# Patient Record
Sex: FEMALE | Race: WHITE | NOT HISPANIC OR LATINO | Employment: OTHER | URBAN - NONMETROPOLITAN AREA
[De-identification: names, ages, dates, MRNs, and addresses within clinical notes are randomized per-mention and may not be internally consistent; named-entity substitution may affect disease eponyms.]

---

## 2021-01-29 ENCOUNTER — IMMUNIZATION (OUTPATIENT)
Dept: VACCINE CLINIC | Facility: HOSPITAL | Age: 76
End: 2021-01-29

## 2021-01-29 PROCEDURE — 0001A: CPT | Performed by: FAMILY MEDICINE

## 2021-01-29 PROCEDURE — 91300 HC SARSCOV02 VAC 30MCG/0.3ML IM: CPT | Performed by: FAMILY MEDICINE

## 2021-02-19 ENCOUNTER — IMMUNIZATION (OUTPATIENT)
Dept: VACCINE CLINIC | Facility: HOSPITAL | Age: 76
End: 2021-02-19

## 2021-02-19 PROCEDURE — 91300 HC SARSCOV02 VAC 30MCG/0.3ML IM: CPT | Performed by: INTERNAL MEDICINE

## 2021-02-19 PROCEDURE — 0002A: CPT | Performed by: INTERNAL MEDICINE

## 2021-03-03 NOTE — PROGRESS NOTES
[facility] Cardiology   Consult  Anastacia Loomis  1945    VISIT DATE:  03/03/21    PCP:   Qing Candelaria, APRN  [address] [place] [address]        CC:  No chief complaint on file.      Problem List:  1.  Hypertension  2.  Diabetes  3.  HLD  4.  Previous breast CA: History of lumpectomy, lymph node dissection, status post 4 cycles of AC chemotherapy, breast radiation-completed anstrazole  5. 1/2 ppd smoker  7.  Lymphedema  8.  Symptomatic PVCs    Labs:  Hemoglobin A1c 7.7  Total cholesterol 148, HDL 63, triglycerides 132, LDL 62    CT scanning cancer surveillance  revealed severe three-vessel coronary artery calcium    History of Present Illness:  Anastacia Loomis  Is a 76 y.o. female with pertinent cardiac history detailed above.  She has multiple risk factors for CAD and was found to have three vessel calcifications on CT scanning.  No  Recent cardiac testing.  EKG with non-specific ST changes, occasional PVCs.  PVCs she states worse with fluctuation in sugar.  Does have frequent indigestion typically with meals.  She does not have any exertional chest pains no dyspnea.  She has left arm lymphedema from her breast cancer surgery but no other swelling.  Lipids and blood pressure are well controlled.  She is on Jardiance for cardiovascular risk reduction.  She smokes about a half a pack per day      There are no active problems to display for this patient.      Not on File    Social History     Socioeconomic History   • Marital status:      Spouse name: Not on file   • Number of children: Not on file   • Years of education: Not on file   • Highest education level: Not on file       No family history on file.    Current Medications:  No current outpatient medications on file.     Review of Systems   Cardiovascular: Negative for chest pain, dyspnea on exertion, irregular heartbeat, leg swelling, near-syncope and orthopnea.   Respiratory: Negative for cough and shortness of breath.     Gastrointestinal: Positive for heartburn.       There were no vitals filed for this visit.    Physical Exam  Vitals signs reviewed.   Constitutional:       Appearance: Normal appearance.   Neck:      Vascular: No carotid bruit.   Cardiovascular:      Rate and Rhythm: Normal rate and regular rhythm.      Pulses: Normal pulses.      Heart sounds: Normal heart sounds.   Pulmonary:      Effort: Pulmonary effort is normal.      Breath sounds: Normal breath sounds.   Abdominal:      Palpations: Abdomen is soft.   Musculoskeletal:      Right lower leg: No edema.      Left lower leg: No edema.      Comments: Lymphedema of the left arm   Neurological:      General: No focal deficit present.      Mental Status: She is alert.         Diagnostic Data:    ECG 12 Lead    Date/Time: 3/4/2021 10:55 AM  Performed by: Dayo Guadarrama MD  Authorized by: Dayo Guadarrama MD   Comparison: not compared with previous ECG   Previous ECG: no previous ECG available  Rhythm: sinus rhythm  Rate: normal  BPM: 73  QRS axis: normal  Other findings: non-specific ST-T wave changes    Clinical impression: abnormal EKG          No results found for: CHLPL, TRIG, HDL, LDLDIRECT  No results found for: GLUCOSE, BUN, CREATININE, NA, K, CL, CO2, CREATININE, BUN  No results found for: HGBA1C  No results found for: WBC, HGB, HCT, PLT    Assessment:  No diagnosis found.    Plan:      1.  Coronary artery calcium on CT scan  -Patient with severe vessel calcification on CT scanning.  She is a diabetic and has multiple risk factors will evaluate for silent ischemia with a stress myocardial perfusion study.  Also obtain echocardiogram.    2.  Hypertension  -Controlled on current medication    3.  Hyperlipidemia  -Well-controlled on rosuvastatin    4.  Diabetes  -Follows with endocrinology, on Jardiance for risk factor reduction    5.  Tobacco use  -Discussed benefits of cessation.  She does not desire pharmacologic therapy at this time    Dayo  DEVON Guadarrama MD FACC

## 2021-03-04 ENCOUNTER — CONSULT (OUTPATIENT)
Dept: CARDIOLOGY | Facility: CLINIC | Age: 76
End: 2021-03-04

## 2021-03-04 VITALS
OXYGEN SATURATION: 96 % | TEMPERATURE: 97.8 F | BODY MASS INDEX: 31.19 KG/M2 | SYSTOLIC BLOOD PRESSURE: 128 MMHG | WEIGHT: 187.2 LBS | HEART RATE: 74 BPM | HEIGHT: 65 IN | DIASTOLIC BLOOD PRESSURE: 60 MMHG

## 2021-03-04 DIAGNOSIS — R94.31 ABNORMAL ELECTROCARDIOGRAM (ECG) (EKG): ICD-10-CM

## 2021-03-04 DIAGNOSIS — F41.9 ANXIETY: ICD-10-CM

## 2021-03-04 DIAGNOSIS — R93.1 HIGH CORONARY ARTERY CALCIUM SCORE: Primary | ICD-10-CM

## 2021-03-04 PROCEDURE — 99204 OFFICE O/P NEW MOD 45 MIN: CPT | Performed by: INTERNAL MEDICINE

## 2021-03-04 PROCEDURE — 93000 ELECTROCARDIOGRAM COMPLETE: CPT | Performed by: INTERNAL MEDICINE

## 2021-03-04 RX ORDER — ASPIRIN 81 MG/1
81 TABLET ORAL DAILY
Qty: 30 TABLET | Refills: 6
Start: 2021-03-04 | End: 2022-03-01

## 2021-03-04 RX ORDER — SERTRALINE HYDROCHLORIDE 25 MG/1
75 TABLET, FILM COATED ORAL DAILY
COMMUNITY

## 2021-03-04 RX ORDER — LOSARTAN POTASSIUM 25 MG/1
25 TABLET ORAL DAILY
COMMUNITY
End: 2022-05-17 | Stop reason: DRUGHIGH

## 2021-03-04 RX ORDER — EMPAGLIFLOZIN 25 MG/1
25 TABLET, FILM COATED ORAL DAILY
COMMUNITY
Start: 2021-02-12 | End: 2022-03-01 | Stop reason: ALTCHOICE

## 2021-03-04 RX ORDER — GLYBURIDE 5 MG/1
10 TABLET ORAL 2 TIMES DAILY
COMMUNITY
Start: 2021-02-22

## 2021-03-04 RX ORDER — VERAPAMIL HYDROCHLORIDE 240 MG/1
240 TABLET, FILM COATED, EXTENDED RELEASE ORAL DAILY
COMMUNITY
Start: 2021-01-05 | End: 2022-07-18 | Stop reason: SDUPTHER

## 2021-03-04 RX ORDER — ROSUVASTATIN CALCIUM 20 MG/1
20 TABLET, COATED ORAL DAILY
COMMUNITY
End: 2022-03-01 | Stop reason: DRUGHIGH

## 2021-03-19 ENCOUNTER — HOSPITAL ENCOUNTER (OUTPATIENT)
Dept: CARDIOLOGY | Facility: HOSPITAL | Age: 76
Discharge: HOME | End: 2021-03-19

## 2021-03-19 VITALS — HEIGHT: 65 IN | BODY MASS INDEX: 31.16 KG/M2 | WEIGHT: 187 LBS

## 2021-03-19 DIAGNOSIS — R93.1 HIGH CORONARY ARTERY CALCIUM SCORE: ICD-10-CM

## 2021-03-19 DIAGNOSIS — R94.31 ABNORMAL ELECTROCARDIOGRAM (ECG) (EKG): ICD-10-CM

## 2021-03-19 NOTE — NURSING NOTE
0845 Pt states she has extreme claustrophobia and that she cannot lay on her back, flat.  LOLITA Marquez, took pt to the camera room to look at the chair, sit in it, and see what it is like with the camera above you. Pt states she could not lay down on the chair, hurt her back, made her very nervous, and very claustrophobic.  RN reminded pt she also has an ECHO at 1:00pm. Pt asks if she has to lay down for that, RN told her yes, pt states no she cannot do that either.  Pt states she needs to cancel both the stress test and the Echo.  We asked to patient to call Dr. Guadarrama's office to talk with him about her other options.  Pt apologized for taking up our time and stated that she would call Dr. Guadarrama's office.

## 2022-02-24 PROBLEM — J45.909 ASTHMA: Status: ACTIVE | Noted: 2022-02-24

## 2022-02-24 PROBLEM — D64.9 ANEMIA: Status: ACTIVE | Noted: 2022-02-24

## 2022-02-24 PROBLEM — C50.919 BREAST CANCER: Status: ACTIVE | Noted: 2022-02-24

## 2022-02-24 PROBLEM — F17.200 SMOKER: Status: ACTIVE | Noted: 2022-02-24

## 2022-02-24 PROBLEM — I89.0 LYMPHEDEMA: Status: ACTIVE | Noted: 2022-02-24

## 2022-02-24 PROBLEM — K50.90 CROHN'S DISEASE: Status: ACTIVE | Noted: 2022-02-24

## 2022-02-24 PROBLEM — E11.9 DM (DIABETES MELLITUS), TYPE 2: Status: ACTIVE | Noted: 2022-02-24

## 2022-02-24 PROBLEM — I10 ESSENTIAL HYPERTENSION: Status: ACTIVE | Noted: 2022-02-24

## 2022-02-24 PROBLEM — E78.2 MIXED HYPERLIPIDEMIA: Status: ACTIVE | Noted: 2022-02-24

## 2022-03-01 ENCOUNTER — OFFICE VISIT (OUTPATIENT)
Dept: CARDIOLOGY | Facility: CLINIC | Age: 77
End: 2022-03-01

## 2022-03-01 VITALS
HEIGHT: 65 IN | OXYGEN SATURATION: 97 % | DIASTOLIC BLOOD PRESSURE: 71 MMHG | HEART RATE: 77 BPM | WEIGHT: 192.4 LBS | SYSTOLIC BLOOD PRESSURE: 169 MMHG | BODY MASS INDEX: 32.06 KG/M2

## 2022-03-01 DIAGNOSIS — I25.10 CORONARY ARTERY DISEASE DUE TO CALCIFIED CORONARY LESION: ICD-10-CM

## 2022-03-01 DIAGNOSIS — I25.84 CORONARY ARTERY DISEASE DUE TO CALCIFIED CORONARY LESION: ICD-10-CM

## 2022-03-01 DIAGNOSIS — R07.2 PRECORDIAL PAIN: ICD-10-CM

## 2022-03-01 DIAGNOSIS — I10 ESSENTIAL HYPERTENSION: Primary | ICD-10-CM

## 2022-03-01 DIAGNOSIS — R06.02 SHORTNESS OF BREATH: ICD-10-CM

## 2022-03-01 DIAGNOSIS — E78.2 MIXED HYPERLIPIDEMIA: ICD-10-CM

## 2022-03-01 DIAGNOSIS — F17.200 SMOKER: ICD-10-CM

## 2022-03-01 DIAGNOSIS — R00.2 PALPITATIONS: ICD-10-CM

## 2022-03-01 PROBLEM — K50.90 CROHN'S DISEASE: Status: RESOLVED | Noted: 2022-02-24 | Resolved: 2022-03-01

## 2022-03-01 PROCEDURE — 99214 OFFICE O/P EST MOD 30 MIN: CPT | Performed by: INTERNAL MEDICINE

## 2022-03-01 PROCEDURE — 93000 ELECTROCARDIOGRAM COMPLETE: CPT | Performed by: INTERNAL MEDICINE

## 2022-03-01 RX ORDER — ASPIRIN 81 MG/1
81 TABLET ORAL DAILY
Qty: 90 TABLET | Refills: 3 | Status: SHIPPED | OUTPATIENT
Start: 2022-03-01

## 2022-03-01 RX ORDER — MONTELUKAST SODIUM 10 MG/1
TABLET ORAL NIGHTLY
COMMUNITY
Start: 2022-01-31 | End: 2022-05-17

## 2022-03-01 RX ORDER — ROSUVASTATIN CALCIUM 40 MG/1
TABLET, COATED ORAL NIGHTLY
COMMUNITY
Start: 2021-10-20

## 2022-03-01 RX ORDER — FEXOFENADINE HCL 180 MG/1
180 TABLET ORAL DAILY
COMMUNITY
End: 2022-11-21

## 2022-03-01 RX ORDER — GUAIFENESIN 600 MG/1
600 TABLET, EXTENDED RELEASE ORAL NIGHTLY PRN
COMMUNITY

## 2022-03-01 RX ORDER — SITAGLIPTIN 100 MG/1
TABLET, FILM COATED ORAL DAILY
COMMUNITY
Start: 2022-01-08

## 2022-03-01 NOTE — PROGRESS NOTES
Subjective   Anastacia Loomis is a 77 y.o. female     Chief Complaint   Patient presents with   • Establish Care     Here for eval.    • Hyperlipidemia   • Hypertension   • Coronary Artery Disease     Per CT lung CA screen at     • Chest Pain   • Shortness of Breath   • Palpitations       PROBLEM LIST:     0. CAD, severe 3 vessel per CT chest at  12-3-2020  1. HTN  2. Hyperlipidemia  3. DM, type 2  4. Hx Breast CA, left s/p chemo./rad. partial mastect. Followed by [facility]  5. Asthma  6. Palpitations  7. Anemia  8. Lymphedema left arm/hand  9. Chronic smoker    Denies Rheumatic / Scarlet Fever        Specialty Problems        Cardiology Problems    Essential hypertension        Mixed hyperlipidemia                HPI:  Ms. Anastacia Jones is a 77-year-old female patient of Qing Cadnelaria seen today for evaluation of coronary artery disease.    Ms. Loomis was treated for breast cancer with partial mastectomy, radiation therapy, and was undergoing screening CT scanning which showed abundant coronary calcification.  Prior to last evening the patient never had chest discomfort.  Last evening, when lying in bed, she developed a burning tight upper retrosternal and upper bilateral precordial chest discomfort which radiated to the shoulders and the upper intrascapular area.  This discomfort was nonpositional, nonpleuritic, but was associated with nausea.  Symptoms lasted 10 to 15 minutes and resolved spontaneously.  The patient describes no exertional chest discomfort.  She describes orthopnea but not PND and has lower extremity edema which is worsened recently after verapamil was increased.  In the spring 2020 when the patient had echocardiography and pharmacologic stress testing ordered.  Those studies were not performed.  The patient describes severe claustrophobia and would not consider any type of nuclear imaging.    Ms. Loomis describes any symptoms of anterior circulation cerebral ischemia, she  describes no davis claudication.  Risk factors for coronary disease include hypertension, diabetes, untreated dyslipidemia, smoking, and family history.                    PRIOR MEDICATIONS    Current Outpatient Medications on File Prior to Visit   Medication Sig Dispense Refill   • fexofenadine (ALLEGRA) 180 MG tablet Take 180 mg by mouth Daily.     • glyburide (DIAbeta) 5 MG tablet 10 mg 2 (Two) Times a Day.     • guaiFENesin (MUCINEX) 600 MG 12 hr tablet Take 600 mg by mouth At Night As Needed for Cough.     • Januvia 100 MG tablet Daily.     • losartan (COZAAR) 25 MG tablet Take 25 mg by mouth Daily.     • metFORMIN (GLUCOPHAGE) 1000 MG tablet Take 1,000 mg by mouth 2 (Two) Times a Day.     • montelukast (SINGULAIR) 10 MG tablet Every Night.     • rosuvastatin (CRESTOR) 40 MG tablet Every Night.     • sertraline (ZOLOFT) 25 MG tablet Take 25 mg by mouth Daily.     • sertraline (ZOLOFT) 50 MG tablet 75 mg Daily.     • verapamil SR (CALAN-SR) 240 MG CR tablet 240 mg Daily.     • [DISCONTINUED] aspirin 81 MG EC tablet Take 1 tablet by mouth Daily. 30 tablet 6   • [DISCONTINUED] Jardiance 25 MG tablet 25 mg Daily.     • [DISCONTINUED] metFORMIN (GLUCOPHAGE) 500 MG tablet Take 1,000 mg by mouth 2 (Two) Times a Day With Meals.     • [DISCONTINUED] rosuvastatin (CRESTOR) 20 MG tablet Take 20 mg by mouth Daily.       No current facility-administered medications on file prior to visit.       ALLERGIES:    Penicillins, Propoxyphene, Sterols (pine), Prednisone, and Sulfamethoxazole    PAST MEDICAL HISTORY:    Past Medical History:   Diagnosis Date   • Anemia    • Arthritis     shoulder on left and right hip   • Asthma    • Cancer (HCC)     left breast, chemo. rad. partial mastect.   • Diabetes mellitus (HCC)    • History of Holter monitoring    • History of ulceration    • Hyperlipidemia    • Hypertension    • Irregular heart rhythm    • Lymphedema    • Menopause        SURGICAL HISTORY:    Past Surgical History:  "  Procedure Laterality Date   • HYSTERECTOMY     • LYMPHADENECTOMY     • SINUS SURGERY     • TUMOR REMOVAL         SOCIAL HISTORY:    Social History     Socioeconomic History   • Marital status:    Tobacco Use   • Smoking status: Current Every Day Smoker     Packs/day: 0.50     Types: Cigarettes   • Smokeless tobacco: Never Used   • Tobacco comment: undecided on quiting   Substance and Sexual Activity   • Alcohol use: Never   • Drug use: Never   • Sexual activity: Defer       FAMILY HISTORY:    Family History   Problem Relation Age of Onset   • Heart attack Mother    • Stroke Father        Review of Systems   Constitutional: Positive for fatigue (increased). Negative for chills, diaphoresis, fever and unexpected weight change.   HENT: Negative.    Eyes: Positive for visual disturbance (glasses).   Respiratory: Positive for shortness of breath.         Denies orthopnea/PND   Cardiovascular: Positive for chest pain (episode chest tightness/burning type sensation across entire chest and into shoulders and through to her back. Lasted approx. 10-15 minutes. went away on its own. No other episodes.), palpitations (increased with elevated glucose) and leg swelling (rt. > lt. ).   Gastrointestinal: Negative for blood in stool (denies melena,hematuria,hematochezia), constipation and diarrhea.   Endocrine: Negative for cold intolerance and heat intolerance.   Genitourinary: Negative.    Musculoskeletal: Positive for arthralgias and myalgias.        Leg cramps at night   Skin: Negative.    Allergic/Immunologic: Positive for environmental allergies.   Neurological: Negative.    Hematological: Bruises/bleeds easily.   Psychiatric/Behavioral: Negative.        VISIT VITALS:  Vitals:    03/01/22 1119   BP: 169/71   BP Location: Left arm   Patient Position: Sitting   Pulse: 77   SpO2: 97%   Weight: 87.3 kg (192 lb 6.4 oz)   Height: 165.1 cm (65\")      /71 (BP Location: Left arm, Patient Position: Sitting)   Pulse 77   " "Ht 165.1 cm (65\")   Wt 87.3 kg (192 lb 6.4 oz)   SpO2 97%   BMI 32.02 kg/m²     RECENT LABS:    Objective       Physical Exam  Vitals and nursing note reviewed.   Constitutional:       General: She is not in acute distress.     Appearance: She is well-developed.   HENT:      Head: Normocephalic and atraumatic.   Eyes:      Conjunctiva/sclera: Conjunctivae normal.      Pupils: Pupils are equal, round, and reactive to light.      Comments: No ptosis or lid lag  Extra ocular motions intact  YUE  Bilat. Arcus senilis     Neck:      Vascular: No carotid bruit, hepatojugular reflux or JVD.      Trachea: No tracheal deviation.      Comments: Nl. Carotid upstrokes  Cardiovascular:      Rate and Rhythm: Normal rate and regular rhythm.      Pulses:           Radial pulses are 2+ on the right side and 2+ on the left side.      Heart sounds: S1 normal and S2 normal. No murmur heard.  No friction rub. Gallop present. S4 (soft) sounds present. No S3 sounds.    Pulmonary:      Effort: Pulmonary effort is normal.      Breath sounds: Normal breath sounds. No wheezing, rhonchi or rales.      Comments: Nl. Expir. Phase  Nl. Breath sound intensity  Bronchial breath sounds in the bases  Mild kyphosis  Abdominal:      General: Bowel sounds are normal. There is no distension or abdominal bruit.      Palpations: Abdomen is soft. There is no mass.      Tenderness: There is no abdominal tenderness. There is no guarding or rebound.      Comments: No organomegaly   Musculoskeletal:         General: No tenderness or deformity. Normal range of motion.      Cervical back: Normal range of motion and neck supple.      Right lower leg: Edema present.      Left lower leg: Edema present.      Comments: LLE, trace edema, unable to palpate pedal pulses, nl. Cap. refill  RLE, 1+ pitting edema, unable to palpate pedal pulses, nl. Cap. refill   Skin:     General: Skin is warm and dry.      Coloration: Skin is not pale.      Findings: No erythema or " rash.   Neurological:      Mental Status: She is alert and oriented to person, place, and time.   Psychiatric:         Behavior: Behavior normal.         Thought Content: Thought content normal.         Judgment: Judgment normal.           ECG 12 Lead    Date/Time: 3/1/2022 12:29 PM  Performed by: Carmelo Block MD  Authorized by: Carmelo Block MD   Comparison: not compared with previous ECG   Comments: Normal sinus rhythm at 70 with borderline first-degree AV block.  Otherwise within normal limits.              Assessment/Plan   #1.  Coronary artery disease.  The patient had severe three-vessel calcification on CT scanning of the chest.  She has had a single episode of chest pain atypical for angina.  The patient refuses nuclear imaging.  Therefore, we will perform dobutamine stress echocardiography for risk ratification and to assess for ischemia as a cause of chest discomfort.  The patient will restart aspirin 81 mg a day.    2.  Orthopnea.  Concerning is the fact that the patient had radiation therapy.  We will obtain echocardiogram to assess LV systolic and diastolic performance, LV filling pressures and pulmonary pressures and, in particular, to look for evidence of constrictive physiology.    3.  Lower extremity and left arm edema.  This is multifactorial in etiology.  I have asked the patient to restrict sodium, elevate her legs, use compression sleeve in her left arm, and follow symptoms closely.    4.  Systemic hypertension.  Blood pressures are generally well controlled.  The patient will continue to monitor and follow-up on blood pressures after testing.    5.  Diabetes.  The patient is not on SGLT2 inhibitor therapy.  Jardiance was stopped because of skin issues.  I wonder if it might be reasonable to trial a another SGLT2 inhibitor because of cardiac and renal protective effects.  I would defer to the patient's other providers in that regard.    6.  Ms. Loomis will follow up in our office  after testing or on a as needed basis as discussed and she will follow with Qing Candelaria as instructed.   Diagnosis Plan   1. Essential hypertension     2. Mixed hyperlipidemia     3. Precordial pain     4. Palpitations     5. Shortness of breath     6. Smoker         No follow-ups on file.         Anastacia Loomis  reports that she has been smoking cigarettes. She has been smoking about 0.50 packs per day. She has never used smokeless tobacco.. I have educated her on the risk of diseases from using tobacco products such as cancer, COPD and heart disease.     I advised her to quit and she is not willing to quit.    I spent 3  minutes counseling the patient.     Advance Care Planning   ACP discussion was held with the patient during this visit. Patient does not have an advance directive, declines further assistance.     Patient's Body mass index is 32.02 kg/m². indicating that she is obese (BMI >30). Obesity-related health conditions include the following: hypertension, diabetes mellitus and dyslipidemias. Obesity is to be assessed at today's visit. BMI is pcp addressing. We discussed portion control and increasing exercise..             Electronically signed by:    Scribed for Carmelo Block MD by Lorena Garay LPN on March 1, 2022  at 11:35 EST    I, Carmelo Block MD personally performed the services described in this documentation as scribed by the above named individual in my presence, and it is both accurate and complete. March 1, 2022 11:35 EST      This note is dictated utilizing voice recognition software.  Although this record has been proof read, transcriptional errors may still be present. If questions occur regarding the content of this record please do not hesitate to call our office.

## 2022-03-17 ENCOUNTER — APPOINTMENT (OUTPATIENT)
Dept: CARDIOLOGY | Facility: HOSPITAL | Age: 77
End: 2022-03-17

## 2022-03-22 ENCOUNTER — APPOINTMENT (OUTPATIENT)
Dept: CARDIOLOGY | Facility: HOSPITAL | Age: 77
End: 2022-03-22

## 2022-03-30 ENCOUNTER — HOSPITAL ENCOUNTER (OUTPATIENT)
Dept: CARDIOLOGY | Facility: HOSPITAL | Age: 77
Discharge: HOME | End: 2022-03-30
Admitting: INTERNAL MEDICINE

## 2022-03-30 ENCOUNTER — HOSPITAL ENCOUNTER (OUTPATIENT)
Dept: CARDIOLOGY | Facility: HOSPITAL | Age: 77
End: 2022-03-30

## 2022-03-30 VITALS — HEIGHT: 65 IN | BODY MASS INDEX: 32.07 KG/M2 | WEIGHT: 192.46 LBS

## 2022-03-30 DIAGNOSIS — I25.84 CORONARY ARTERY DISEASE DUE TO CALCIFIED CORONARY LESION: ICD-10-CM

## 2022-03-30 DIAGNOSIS — F17.200 SMOKER: ICD-10-CM

## 2022-03-30 DIAGNOSIS — I25.10 CORONARY ARTERY DISEASE DUE TO CALCIFIED CORONARY LESION: ICD-10-CM

## 2022-03-30 DIAGNOSIS — I10 ESSENTIAL HYPERTENSION: ICD-10-CM

## 2022-03-30 DIAGNOSIS — R07.2 PRECORDIAL PAIN: ICD-10-CM

## 2022-03-30 DIAGNOSIS — E78.2 MIXED HYPERLIPIDEMIA: ICD-10-CM

## 2022-03-30 DIAGNOSIS — R00.2 PALPITATIONS: ICD-10-CM

## 2022-03-30 DIAGNOSIS — R06.02 SHORTNESS OF BREATH: ICD-10-CM

## 2022-03-30 PROCEDURE — 93356 MYOCRD STRAIN IMG SPCKL TRCK: CPT

## 2022-03-30 PROCEDURE — 93306 TTE W/DOPPLER COMPLETE: CPT

## 2022-03-30 PROCEDURE — 93356 MYOCRD STRAIN IMG SPCKL TRCK: CPT | Performed by: INTERNAL MEDICINE

## 2022-03-30 PROCEDURE — 93306 TTE W/DOPPLER COMPLETE: CPT | Performed by: INTERNAL MEDICINE

## 2022-04-08 ENCOUNTER — APPOINTMENT (OUTPATIENT)
Dept: CARDIOLOGY | Facility: HOSPITAL | Age: 77
End: 2022-04-08

## 2022-04-08 ENCOUNTER — HOSPITAL ENCOUNTER (OUTPATIENT)
Dept: CARDIOLOGY | Facility: HOSPITAL | Age: 77
Discharge: HOME | End: 2022-04-08
Admitting: INTERNAL MEDICINE

## 2022-04-08 VITALS — WEIGHT: 192.46 LBS | BODY MASS INDEX: 32.07 KG/M2 | HEIGHT: 65 IN

## 2022-04-08 DIAGNOSIS — E78.2 MIXED HYPERLIPIDEMIA: ICD-10-CM

## 2022-04-08 DIAGNOSIS — R06.02 SHORTNESS OF BREATH: ICD-10-CM

## 2022-04-08 DIAGNOSIS — I10 ESSENTIAL HYPERTENSION: ICD-10-CM

## 2022-04-08 DIAGNOSIS — R00.2 PALPITATIONS: ICD-10-CM

## 2022-04-08 DIAGNOSIS — R07.2 PRECORDIAL PAIN: ICD-10-CM

## 2022-04-08 DIAGNOSIS — I25.10 CORONARY ARTERY DISEASE DUE TO CALCIFIED CORONARY LESION: ICD-10-CM

## 2022-04-08 DIAGNOSIS — I25.84 CORONARY ARTERY DISEASE DUE TO CALCIFIED CORONARY LESION: ICD-10-CM

## 2022-04-08 DIAGNOSIS — F17.200 SMOKER: ICD-10-CM

## 2022-04-08 PROCEDURE — 93320 DOPPLER ECHO COMPLETE: CPT

## 2022-04-08 PROCEDURE — 93018 CV STRESS TEST I&R ONLY: CPT | Performed by: INTERNAL MEDICINE

## 2022-04-08 PROCEDURE — 25010000002 DOBUTAMINE PER 250 MG: Performed by: INTERNAL MEDICINE

## 2022-04-08 PROCEDURE — 93325 DOPPLER ECHO COLOR FLOW MAPG: CPT

## 2022-04-08 PROCEDURE — 93351 STRESS TTE COMPLETE: CPT

## 2022-04-08 PROCEDURE — 93350 STRESS TTE ONLY: CPT | Performed by: INTERNAL MEDICINE

## 2022-04-08 RX ORDER — DOBUTAMINE HYDROCHLORIDE 200 MG/100ML
10 INJECTION INTRAVENOUS
Status: COMPLETED | OUTPATIENT
Start: 2022-04-08 | End: 2022-04-08

## 2022-04-08 RX ADMIN — DOBUTAMINE HYDROCHLORIDE 10 MCG/KG/MIN: 200 INJECTION INTRAVENOUS at 15:37

## 2022-04-10 LAB
AORTIC DIMENSIONLESS INDEX: 0.65 (DI)
BH CV ECHO LEFT VENTRICLE GLOBAL LONGITUDINAL STRAIN: -12.7 %
BH CV ECHO MEAS - ACS: 1.68 CM
BH CV ECHO MEAS - AO MAX PG: 7.2 MMHG
BH CV ECHO MEAS - AO MEAN PG: 4 MMHG
BH CV ECHO MEAS - AO ROOT DIAM: 2.9 CM
BH CV ECHO MEAS - AO V2 MAX: 134.3 CM/SEC
BH CV ECHO MEAS - AO V2 VTI: 30.1 CM
BH CV ECHO MEAS - EDV(CUBED): 113 ML
BH CV ECHO MEAS - EF(MOD-BP): 54 %
BH CV ECHO MEAS - EF_3D-VOL: 56 %
BH CV ECHO MEAS - ESV(CUBED): 42.5 ML
BH CV ECHO MEAS - FS: 27.8 %
BH CV ECHO MEAS - IVS/LVPW: 0.97 CM
BH CV ECHO MEAS - IVSD: 1.09 CM
BH CV ECHO MEAS - LA 3D VOL INDEX: 41
BH CV ECHO MEAS - LA A2CS (ATRIAL LENGTH): 5.5 CM
BH CV ECHO MEAS - LA DIMENSION(2D): 4.3 CM
BH CV ECHO MEAS - LA DIMENSION: 4.3 CM
BH CV ECHO MEAS - LAT PEAK E' VEL: 7.2 CM/SEC
BH CV ECHO MEAS - LV MASS(C)D: 199.2 GRAMS
BH CV ECHO MEAS - LV MAX PG: 3.1 MMHG
BH CV ECHO MEAS - LV MEAN PG: 1.61 MMHG
BH CV ECHO MEAS - LV V1 MAX: 87.8 CM/SEC
BH CV ECHO MEAS - LV V1 VTI: 18.8 CM
BH CV ECHO MEAS - LVIDD: 4.8 CM
BH CV ECHO MEAS - LVIDS: 3.5 CM
BH CV ECHO MEAS - LVPWD: 1.13 CM
BH CV ECHO MEAS - MED PEAK E' VEL: 5 CM/SEC
BH CV ECHO MEAS - MR MAX PG: 54.3 MMHG
BH CV ECHO MEAS - MR MAX VEL: 368.3 CM/SEC
BH CV ECHO MEAS - MV A MAX VEL: 105.8 CM/SEC
BH CV ECHO MEAS - MV DEC SLOPE: 324.8 CM/SEC2
BH CV ECHO MEAS - MV DEC TIME: 0.18 MSEC
BH CV ECHO MEAS - MV E MAX VEL: 64.9 CM/SEC
BH CV ECHO MEAS - MV E/A: 0.61
BH CV ECHO MEAS - MV MAX PG: 3.8 MMHG
BH CV ECHO MEAS - MV MEAN PG: 1.75 MMHG
BH CV ECHO MEAS - MV P1/2T: 64 MSEC
BH CV ECHO MEAS - MV V2 VTI: 24.4 CM
BH CV ECHO MEAS - MVA(P1/2T): 3.4 CM2
BH CV ECHO MEAS - PA V2 MAX: 93.7 CM/SEC
BH CV ECHO MEAS - PI END-D VEL: 111.1 CM/SEC
BH CV ECHO MEAS - RAP SYSTOLE: 10 MMHG
BH CV ECHO MEAS - RV MAX PG: 2.31 MMHG
BH CV ECHO MEAS - RV V1 MAX: 75.9 CM/SEC
BH CV ECHO MEAS - RV V1 VTI: 16.8 CM
BH CV ECHO MEAS - RVDD: 3.4 CM
BH CV ECHO MEAS - RVSP: 29.4 MMHG
BH CV ECHO MEAS - TAPSE (>1.6): 1.92 CM
BH CV ECHO MEAS - TR MAX PG: 19.4 MMHG
BH CV ECHO MEAS - TR MAX VEL: 220.3 CM/SEC
BH CV ECHO MEASUREMENTS AVERAGE E/E' RATIO: 10.64
BH CV XLRA - TDI S': 10.3 CM/SEC
IVRT: 111 MSEC
LEFT ATRIUM VOLUME INDEX: 31 ML/M2
LEFT ATRIUM VOLUME: 60 CM3
MAXIMAL PREDICTED HEART RATE: 143 BPM
SINUS: 3.1 CM
STJ: 2.4 CM
STRESS TARGET HR: 122 BPM

## 2022-04-11 ENCOUNTER — TELEPHONE (OUTPATIENT)
Dept: CARDIOLOGY | Facility: CLINIC | Age: 77
End: 2022-04-11

## 2022-04-11 NOTE — TELEPHONE ENCOUNTER
ECHO RESULTS BRIEFLY DISCUSSED WITH  JUNG. ISABEL,LPN          ----- Message from Carmelo Block MD sent at 4/11/2022  2:34 PM EDT -----  Keep May f/u appt.

## 2022-04-19 LAB
BH CV STRESS DOSE DOBUTAMINE STAGE 1: 10
BH CV STRESS DURATION MIN STAGE 1: 2
BH CV STRESS DURATION SEC STAGE 1: 0
BH CV STRESS PROTOCOL 1: NORMAL
BH CV STRESS RECOVERY BP: NORMAL MMHG
BH CV STRESS RECOVERY HR: 108 BPM
BH CV STRESS STAGE 1: 1
MAXIMAL PREDICTED HEART RATE: 143 BPM
PERCENT MAX PREDICTED HR: 85.31 %
STRESS BASELINE BP: NORMAL MMHG
STRESS BASELINE HR: 69 BPM
STRESS PERCENT HR: 100 %
STRESS POST PEAK BP: NORMAL MMHG
STRESS POST PEAK HR: 122 BPM
STRESS TARGET HR: 122 BPM

## 2022-04-26 ENCOUNTER — TELEPHONE (OUTPATIENT)
Dept: CARDIOLOGY | Facility: CLINIC | Age: 77
End: 2022-04-26

## 2022-04-26 NOTE — TELEPHONE ENCOUNTER
DAUGHTER, EMANUEL NOTIFIED OF STRESS ECHO RESULTS AND TO KEEP APPT. NEXT MO. PIETRO HALL          ----- Message from Carmelo Block MD sent at 4/26/2022  4:31 PM EDT -----  Keep May f/u appt.

## 2022-05-17 ENCOUNTER — OFFICE VISIT (OUTPATIENT)
Dept: CARDIOLOGY | Facility: CLINIC | Age: 77
End: 2022-05-17

## 2022-05-17 VITALS
HEIGHT: 65 IN | OXYGEN SATURATION: 98 % | HEART RATE: 91 BPM | DIASTOLIC BLOOD PRESSURE: 88 MMHG | BODY MASS INDEX: 32.36 KG/M2 | WEIGHT: 194.2 LBS | SYSTOLIC BLOOD PRESSURE: 179 MMHG

## 2022-05-17 DIAGNOSIS — R00.2 PALPITATIONS: ICD-10-CM

## 2022-05-17 DIAGNOSIS — E78.2 MIXED HYPERLIPIDEMIA: ICD-10-CM

## 2022-05-17 DIAGNOSIS — R60.0 BILATERAL LEG EDEMA: ICD-10-CM

## 2022-05-17 DIAGNOSIS — I10 ESSENTIAL HYPERTENSION: Primary | ICD-10-CM

## 2022-05-17 DIAGNOSIS — H81.13 BENIGN PAROXYSMAL POSITIONAL VERTIGO DUE TO BILATERAL VESTIBULAR DISORDER: ICD-10-CM

## 2022-05-17 DIAGNOSIS — R42 DIZZINESS: ICD-10-CM

## 2022-05-17 DIAGNOSIS — F17.200 SMOKER: ICD-10-CM

## 2022-05-17 DIAGNOSIS — I73.9 PAD (PERIPHERAL ARTERY DISEASE): ICD-10-CM

## 2022-05-17 DIAGNOSIS — R06.02 SHORTNESS OF BREATH: ICD-10-CM

## 2022-05-17 PROCEDURE — 99213 OFFICE O/P EST LOW 20 MIN: CPT | Performed by: INTERNAL MEDICINE

## 2022-05-17 RX ORDER — FUROSEMIDE 20 MG/1
20 TABLET ORAL DAILY
Qty: 30 TABLET | Refills: 11 | Status: SHIPPED | OUTPATIENT
Start: 2022-05-17

## 2022-05-17 RX ORDER — LOSARTAN POTASSIUM 100 MG/1
50 TABLET ORAL DAILY
COMMUNITY
Start: 2022-04-11 | End: 2023-01-11 | Stop reason: ALTCHOICE

## 2022-05-26 ENCOUNTER — APPOINTMENT (OUTPATIENT)
Dept: CARDIOLOGY | Facility: HOSPITAL | Age: 77
End: 2022-05-26

## 2022-06-06 ENCOUNTER — LAB (OUTPATIENT)
Dept: LAB | Facility: HOSPITAL | Age: 77
End: 2022-06-06

## 2022-06-06 ENCOUNTER — HOSPITAL ENCOUNTER (OUTPATIENT)
Dept: CARDIOLOGY | Facility: HOSPITAL | Age: 77
Discharge: HOME | End: 2022-06-06

## 2022-06-06 DIAGNOSIS — R42 DIZZINESS: ICD-10-CM

## 2022-06-06 DIAGNOSIS — H81.13 BENIGN PAROXYSMAL POSITIONAL VERTIGO DUE TO BILATERAL VESTIBULAR DISORDER: ICD-10-CM

## 2022-06-06 DIAGNOSIS — I73.9 PAD (PERIPHERAL ARTERY DISEASE): ICD-10-CM

## 2022-06-06 DIAGNOSIS — I10 ESSENTIAL HYPERTENSION: ICD-10-CM

## 2022-06-06 DIAGNOSIS — R00.2 PALPITATIONS: ICD-10-CM

## 2022-06-06 DIAGNOSIS — R60.0 BILATERAL LEG EDEMA: ICD-10-CM

## 2022-06-06 DIAGNOSIS — E78.2 MIXED HYPERLIPIDEMIA: ICD-10-CM

## 2022-06-06 LAB
ANION GAP SERPL CALCULATED.3IONS-SCNC: 15.5 MMOL/L (ref 5–15)
BUN SERPL-MCNC: 21 MG/DL (ref 8–23)
BUN/CREAT SERPL: 25.6 (ref 7–25)
CALCIUM SPEC-SCNC: 9.7 MG/DL (ref 8.6–10.5)
CHLORIDE SERPL-SCNC: 95 MMOL/L (ref 98–107)
CO2 SERPL-SCNC: 22.5 MMOL/L (ref 22–29)
CREAT SERPL-MCNC: 0.82 MG/DL (ref 0.57–1)
EGFRCR SERPLBLD CKD-EPI 2021: 73.8 ML/MIN/1.73
GLUCOSE SERPL-MCNC: 192 MG/DL (ref 65–99)
MAGNESIUM SERPL-MCNC: 1.6 MG/DL (ref 1.6–2.4)
POTASSIUM SERPL-SCNC: 4 MMOL/L (ref 3.5–5.2)
SODIUM SERPL-SCNC: 133 MMOL/L (ref 136–145)

## 2022-06-06 PROCEDURE — 83735 ASSAY OF MAGNESIUM: CPT

## 2022-06-06 PROCEDURE — 93880 EXTRACRANIAL BILAT STUDY: CPT

## 2022-06-06 PROCEDURE — 93880 EXTRACRANIAL BILAT STUDY: CPT | Performed by: INTERNAL MEDICINE

## 2022-06-06 PROCEDURE — 80048 BASIC METABOLIC PNL TOTAL CA: CPT

## 2022-06-18 LAB
BH CV XLRA MEAS LEFT DIST CCA EDV: -14.1 CM/SEC
BH CV XLRA MEAS LEFT DIST CCA PSV: -61.7 CM/SEC
BH CV XLRA MEAS LEFT DIST ICA EDV: -30.8 CM/SEC
BH CV XLRA MEAS LEFT DIST ICA PSV: -87.4 CM/SEC
BH CV XLRA MEAS LEFT ICA/CCA RATIO: -1.96
BH CV XLRA MEAS LEFT MID ICA EDV: -33.3 CM/SEC
BH CV XLRA MEAS LEFT MID ICA PSV: -93 CM/SEC
BH CV XLRA MEAS LEFT PROX CCA EDV: 22.6 CM/SEC
BH CV XLRA MEAS LEFT PROX CCA PSV: 116.9 CM/SEC
BH CV XLRA MEAS LEFT PROX ECA EDV: -7.9 CM/SEC
BH CV XLRA MEAS LEFT PROX ECA PSV: -84.3 CM/SEC
BH CV XLRA MEAS LEFT PROX ICA EDV: 24.5 CM/SEC
BH CV XLRA MEAS LEFT PROX ICA PSV: 120.7 CM/SEC
BH CV XLRA MEAS LEFT VERTEBRAL A EDV: -23.3 CM/SEC
BH CV XLRA MEAS LEFT VERTEBRAL A PSV: -71 CM/SEC
BH CV XLRA MEAS RIGHT DIST CCA EDV: -14 CM/SEC
BH CV XLRA MEAS RIGHT DIST CCA PSV: -66.8 CM/SEC
BH CV XLRA MEAS RIGHT DIST ICA EDV: -31.4 CM/SEC
BH CV XLRA MEAS RIGHT DIST ICA PSV: -94.3 CM/SEC
BH CV XLRA MEAS RIGHT ICA/CCA RATIO: 1.64
BH CV XLRA MEAS RIGHT MID ICA EDV: -28.9 CM/SEC
BH CV XLRA MEAS RIGHT MID ICA PSV: -110 CM/SEC
BH CV XLRA MEAS RIGHT PROX CCA EDV: 15.7 CM/SEC
BH CV XLRA MEAS RIGHT PROX CCA PSV: 75.4 CM/SEC
BH CV XLRA MEAS RIGHT PROX ECA EDV: -11.9 CM/SEC
BH CV XLRA MEAS RIGHT PROX ECA PSV: -109.6 CM/SEC
BH CV XLRA MEAS RIGHT PROX ICA EDV: -21.4 CM/SEC
BH CV XLRA MEAS RIGHT PROX ICA PSV: -94.9 CM/SEC
BH CV XLRA MEAS RIGHT VERTEBRAL A EDV: 15.7 CM/SEC
BH CV XLRA MEAS RIGHT VERTEBRAL A PSV: 52.8 CM/SEC
MAXIMAL PREDICTED HEART RATE: 143 BPM
STRESS TARGET HR: 122 BPM

## 2022-06-21 ENCOUNTER — TELEPHONE (OUTPATIENT)
Dept: CARDIOLOGY | Facility: CLINIC | Age: 77
End: 2022-06-21

## 2022-06-21 NOTE — TELEPHONE ENCOUNTER
DAUGHTER, EMANUEL, NOTIFIED OF CAROTID U/S RESULTS AND TO RELAY TO MOM. PIETRO HALL           ----- Message from Carmelo Block MD sent at 6/21/2022  8:47 AM EDT -----  Routine f/u.

## 2022-07-18 ENCOUNTER — OFFICE VISIT (OUTPATIENT)
Dept: CARDIOLOGY | Facility: CLINIC | Age: 77
End: 2022-07-18

## 2022-07-18 VITALS
BODY MASS INDEX: 32.42 KG/M2 | HEIGHT: 65 IN | SYSTOLIC BLOOD PRESSURE: 174 MMHG | WEIGHT: 194.6 LBS | HEART RATE: 82 BPM | OXYGEN SATURATION: 96 % | DIASTOLIC BLOOD PRESSURE: 75 MMHG

## 2022-07-18 DIAGNOSIS — R05.9 COUGH: ICD-10-CM

## 2022-07-18 DIAGNOSIS — I47.29 VENTRICULAR TACHYCARDIA (PAROXYSMAL): Primary | ICD-10-CM

## 2022-07-18 DIAGNOSIS — R06.02 SHORTNESS OF BREATH: ICD-10-CM

## 2022-07-18 PROCEDURE — 99213 OFFICE O/P EST LOW 20 MIN: CPT | Performed by: NURSE PRACTITIONER

## 2022-07-18 RX ORDER — DOXYCYCLINE HYCLATE 100 MG/1
100 CAPSULE ORAL 2 TIMES DAILY
COMMUNITY
End: 2023-01-11

## 2022-07-18 NOTE — PROGRESS NOTES
Subjective     Anastacia Loomis is a 77 y.o. female who presents to day for Hypertension.    CHIEF COMPLIANT  Chief Complaint   Patient presents with   • Hypertension       Active Problems:  Problem List Items Addressed This Visit        Pulmonary and Pneumonias    Shortness of breath    Relevant Orders    XR Chest PA & Lateral      Other Visit Diagnoses     Ventricular tachycardia (paroxysmal) (HCC)    -  Primary    Relevant Medications    metoprolol tartrate (LOPRESSOR) 25 MG tablet    Cough        Relevant Orders    XR Chest PA & Lateral      PROBLEM LIST:      0. CAD, severe 3 vessel per CT chest at  12-3-2020  0.1 Dobutamine stress echo, 4-8-2022, no ischemia,   There is very mild inferobasilar and diaphragmatic hypokinesis at baseline with normal augmentation of wall motion and wall thickening in all segments.  There was an increase in global left ventricular ejection fraction and decrease in left ventricular end-systolic dimensions.  1. HTN  2. Hyperlipidemia  3. DM, type 2  4. Hx Breast CA, left s/p chemo./rad. partial mastect. Followed by [facility]  5. Asthma  6. Palpitations  7. Anemia  8. Lymphedema left arm/hand  9. Chronic smoker  10. Echo, 3-, EF 50+-%, mod. LVH, grade 1 DD, minimal mitral leaflet sclerosis, trivial-mild MR, trivial TR, trivial post and post. Lateral pericardial effusion, pulm. Pressures 30 mmHg    HPI  HPI  Ms. Loomis is a 77-year-old female patient who is being followed up today for chronic arterial hypertension, chest pain, and shortness of breath.  She does report an achy type sensation that occurs in her chest that occurs intermittently and can last up to an hour per episode.  It occurs both with rest and exertion with no reported symptoms.  She does report shortness of breath in which she does have associated cough.  She does have green sputum production. Her Sob is worse with exertion but can occur at rest.  She does have chronic arterial hypertension in which  her blood pressure is elevated today at 129/77.  She is currently being treated with losartan, and metoprolol.  She also has chronic lower extremity edema in which she is being treated with Lasix.  She also has continuation of her palpitations and dizziness.  Her do this mainly occurs when she changes positions.  She did go under a dobutamine stress test earlier this year that was negative for ischemia and relatively unremarkable echocardiogram.  However she did have a lateral pericardial effusion.  She does report fatigue that has worsened over the last month or so.  She denies any syncope or strokelike symptoms.  PRIOR MEDS  Current Outpatient Medications on File Prior to Visit   Medication Sig Dispense Refill   • aspirin (aspirin) 81 MG EC tablet Take 1 tablet by mouth Daily. 90 tablet 3   • doxycycline (VIBRAMYCIN) 100 MG capsule Take 100 mg by mouth 2 (Two) Times a Day.     • fexofenadine (ALLEGRA) 180 MG tablet Take 180 mg by mouth Daily.     • furosemide (LASIX) 20 MG tablet Take 1 tablet by mouth Daily. 30 tablet 11   • glyburide (DIAbeta) 5 MG tablet 10 mg 2 (Two) Times a Day.     • guaiFENesin (MUCINEX) 600 MG 12 hr tablet Take 600 mg by mouth At Night As Needed for Cough.     • Januvia 100 MG tablet Daily.     • losartan (COZAAR) 100 MG tablet 50 mg Daily.     • metFORMIN (GLUCOPHAGE) 1000 MG tablet Take 1,000 mg by mouth 2 (Two) Times a Day.     • rosuvastatin (CRESTOR) 40 MG tablet Every Night.     • sertraline (ZOLOFT) 25 MG tablet Take 25 mg by mouth Daily.     • sertraline (ZOLOFT) 50 MG tablet 75 mg Daily.       No current facility-administered medications on file prior to visit.       ALLERGIES  Penicillins, Propoxyphene, Sterols (pine), Jardiance [empagliflozin], Prednisone, and Sulfamethoxazole    HISTORY  Past Medical History:   Diagnosis Date   • Anemia    • Arthritis     shoulder on left and right hip   • Asthma    • Cancer (HCC)     left breast, chemo. rad. partial mastect.   • Diabetes  "mellitus (HCC)    • History of Holter monitoring    • History of ulceration    • Hyperlipidemia    • Hypertension    • Irregular heart rhythm    • Lymphedema    • Menopause        Social History     Socioeconomic History   • Marital status:    Tobacco Use   • Smoking status: Current Every Day Smoker     Packs/day: 0.50     Types: Cigarettes   • Smokeless tobacco: Never Used   • Tobacco comment: undecided on quiting   Substance and Sexual Activity   • Alcohol use: Never   • Drug use: Never   • Sexual activity: Defer       Family History   Problem Relation Age of Onset   • Heart attack Mother    • Rheumatic fever Mother    • Stroke Father    • Heart attack Brother        Review of Systems   Constitutional: Positive for fatigue (last month or so).   HENT: Positive for postnasal drip and rhinorrhea.    Eyes: Positive for visual disturbance (glasses).   Respiratory: Positive for cough (green sputum) and shortness of breath.    Cardiovascular: Positive for chest pain (achey inside of chest, irritated 1 hour at a time), palpitations and leg swelling.   Gastrointestinal: Negative for blood in stool, constipation, diarrhea and nausea.   Endocrine: Positive for polydipsia, polyphagia and polyuria.   Genitourinary: Positive for frequency and urgency.   Musculoskeletal: Positive for arthralgias, back pain and joint swelling.   Skin: Positive for wound.   Neurological: Positive for dizziness. Negative for syncope.   Psychiatric/Behavioral: Positive for sleep disturbance (sleep in recliner).       Objective     VITALS: /75 (BP Location: Right arm, Patient Position: Sitting)   Pulse 82   Ht 165 cm (64.96\")   Wt 88.3 kg (194 lb 9.6 oz)   SpO2 96%   BMI 32.42 kg/m²     LABS:   Lab Results (most recent)     None          IMAGING:   Mammography Breast Diagnostic Tomosynthesis Bilateral    Result Date: 4/4/2022  BI-RADS Code: BI-RADS 2. Benign finding. RECOMMENDATIONS: Diagnostic Mammogram in 1 Year CRITICAL RESULT: " No. COMMUNICATION: The results and recommendations were discussed with the patient and a printed lay language version of the imaging report was given to the patient at the time of the visit. The mammogram was read with the assistance of CAD and tomosynthesis. Dictated by Bassem Cordova on 4/4/2022 1:01 PM Signed by Bassem Cordova on 4/4/2022 1:05 PM    CT Chest Lung Cancer Screening    Result Date: 4/4/2022  Negative screening examination. Recommendations: LungRADS 1: Negative: CT Chest Lung Cancer Screening recommended in 12 months. Modifier: None CRITICAL RESULT:   No. COMMUNICATION: Per this written report. Dictated by Randolph Valle on 4/4/2022 12:29 PM Signed by Randolph Valle on 4/4/2022 12:31 PM      EXAM:  Physical Exam  Vitals and nursing note reviewed.   Constitutional:       Appearance: She is well-developed.   HENT:      Head: Normocephalic.   Eyes:      Pupils: Pupils are equal, round, and reactive to light.   Neck:      Thyroid: No thyroid mass.      Vascular: No carotid bruit or JVD.      Trachea: Trachea and phonation normal.   Cardiovascular:      Rate and Rhythm: Normal rate and regular rhythm.      Pulses:           Radial pulses are 2+ on the right side and 2+ on the left side.        Posterior tibial pulses are 2+ on the right side and 2+ on the left side.      Heart sounds: Normal heart sounds. No murmur heard.    No friction rub. No gallop.   Pulmonary:      Effort: Pulmonary effort is normal. No respiratory distress.      Breath sounds: Normal breath sounds. No wheezing or rales.   Abdominal:      General: Bowel sounds are normal.      Palpations: Abdomen is soft.   Musculoskeletal:         General: Swelling (trace) present. Normal range of motion.      Cervical back: Neck supple.   Skin:     General: Skin is warm and dry.      Capillary Refill: Capillary refill takes less than 2 seconds.      Findings: No rash.   Neurological:      Mental Status: She is alert and oriented to person,  place, and time.   Psychiatric:         Speech: Speech normal.         Behavior: Behavior normal.         Thought Content: Thought content normal.         Judgment: Judgment normal.         Procedure   Procedures       Assessment & Plan    Diagnosis Plan   1. Ventricular tachycardia (paroxysmal) (HCC)  metoprolol tartrate (LOPRESSOR) 25 MG tablet   2. Shortness of breath  XR Chest PA & Lateral   3. Cough  XR Chest PA & Lateral   1.  Due to patient's shortness of breath and productive cough with green sputum I would like to get an x-ray to look for potential cause of the patient's symptoms such as pneumonia or vascular congestion.  2.  We will continue patient's metoprolol for ventricular tachycardia.  She did have a negative dobutamine stress test in the recent past.  3.  Patient's blood pressure is controlled on current blood pressure medication regimen despite elevated blood pressure today.  No medication changes are warranted at this time.  Patient advised to monitor blood pressure on a daily basis and report any persistent highs or lows.  Set goal blood pressure for patient at 130/80 or below.  4.  Informed of signs and symptoms of ACS and advised to seek emergent treatment for any new worsening symptoms.  Patient also advised sooner follow-up as needed.  Also advised to follow-up with family doctor as needed  This note is dictated utilizing voice recognition software.  Although this record has been proof read, transcriptional errors may still be present. If questions occur regarding the content of this record please do not hesitate to call our office.  I have reviewed and confirmed the accuracy of the ROS as documented by the MA/LPN/GUZMAN Burk     No follow-ups on file.    Diagnoses and all orders for this visit:    1. Ventricular tachycardia (paroxysmal) (HCC) (Primary)  -     metoprolol tartrate (LOPRESSOR) 25 MG tablet; Take 1 tablet by mouth 2 (Two) Times a Day.  Dispense: 60 tablet; Refill:  11    2. Shortness of breath  -     XR Chest PA & Lateral; Future    3. Cough  -     XR Chest PA & Lateral; Future        Anastacia Loomis  reports that she has been smoking cigarettes. She has been smoking about 0.50 packs per day. She has never used smokeless tobacco.                MEDS ORDERED DURING VISIT:  New Medications Ordered This Visit   Medications   • metoprolol tartrate (LOPRESSOR) 25 MG tablet     Sig: Take 1 tablet by mouth 2 (Two) Times a Day.     Dispense:  60 tablet     Refill:  11           This document has been electronically signed by Maldonado Erazo Jr., APRMARIAELENA  July 31, 2022 21:50 EDT

## 2022-09-29 ENCOUNTER — TELEPHONE (OUTPATIENT)
Dept: CARDIOLOGY | Facility: CLINIC | Age: 77
End: 2022-09-29

## 2022-09-29 DIAGNOSIS — I10 ESSENTIAL HYPERTENSION: Primary | ICD-10-CM

## 2022-09-29 RX ORDER — AMLODIPINE BESYLATE 5 MG/1
5 TABLET ORAL DAILY
Qty: 30 TABLET | Refills: 11 | Status: SHIPPED | OUTPATIENT
Start: 2022-09-29

## 2022-09-29 NOTE — TELEPHONE ENCOUNTER
----- Message from Anastacia Loomis sent at 9/29/2022 10:48 AM EDT -----  Regarding: Blood Pressure  Good morning,    I wanted to follow-up, I did double my Losartan dosage and have seen very little change in my BP.  My top number is staying in the 150 to 160 range over a bottom number between mid 70 to low 80's.  My last reading yesterday was 164/74 at my family physician office.  My family physician is wanting me to add another blood pressure medicine.  She requested I follow-up with you.     I am also still having more swelling in my right leg and pain.      Thanks     Carla Loomis

## 2022-09-29 NOTE — TELEPHONE ENCOUNTER
Per JR he would like for patient to start Amlodipine 5 mg po qd . Keep record of blood pressure and let us know how they are doing. Sent message through my chart.      Enedina STINSON

## 2022-11-15 ENCOUNTER — TELEPHONE (OUTPATIENT)
Dept: CARDIOLOGY | Facility: CLINIC | Age: 77
End: 2022-11-15

## 2022-11-15 NOTE — TELEPHONE ENCOUNTER
Patient was given a chest x ray order . Patient decided that she does not need to have this done.      Enedina STINSON

## 2022-11-21 ENCOUNTER — OFFICE VISIT (OUTPATIENT)
Dept: CARDIOLOGY | Facility: CLINIC | Age: 77
End: 2022-11-21

## 2022-11-21 VITALS
HEART RATE: 66 BPM | WEIGHT: 196 LBS | OXYGEN SATURATION: 95 % | SYSTOLIC BLOOD PRESSURE: 144 MMHG | BODY MASS INDEX: 32.65 KG/M2 | HEIGHT: 65 IN | DIASTOLIC BLOOD PRESSURE: 60 MMHG

## 2022-11-21 DIAGNOSIS — R60.0 EDEMA LEG: ICD-10-CM

## 2022-11-21 DIAGNOSIS — R00.2 PALPITATIONS: ICD-10-CM

## 2022-11-21 DIAGNOSIS — R06.02 SHORTNESS OF BREATH: ICD-10-CM

## 2022-11-21 DIAGNOSIS — R07.2 PRECORDIAL PAIN: ICD-10-CM

## 2022-11-21 DIAGNOSIS — E78.2 MIXED HYPERLIPIDEMIA: ICD-10-CM

## 2022-11-21 DIAGNOSIS — F17.200 SMOKER: ICD-10-CM

## 2022-11-21 DIAGNOSIS — Z86.16 HISTORY OF COVID-19: ICD-10-CM

## 2022-11-21 DIAGNOSIS — I10 ESSENTIAL HYPERTENSION: Primary | ICD-10-CM

## 2022-11-21 DIAGNOSIS — R60.0 BILATERAL LEG EDEMA: ICD-10-CM

## 2022-11-21 PROCEDURE — 93971 EXTREMITY STUDY: CPT | Performed by: INTERNAL MEDICINE

## 2022-11-21 PROCEDURE — 99214 OFFICE O/P EST MOD 30 MIN: CPT | Performed by: INTERNAL MEDICINE

## 2022-11-21 RX ORDER — ALBUTEROL SULFATE 90 UG/1
AEROSOL, METERED RESPIRATORY (INHALATION)
COMMUNITY
Start: 2022-11-01

## 2022-11-21 RX ORDER — CARVEDILOL 25 MG/1
25 TABLET ORAL 2 TIMES DAILY
Qty: 60 TABLET | Refills: 11 | Status: SHIPPED | OUTPATIENT
Start: 2022-11-21

## 2022-11-21 RX ORDER — ALBUTEROL SULFATE 2.5 MG/3ML
SOLUTION RESPIRATORY (INHALATION)
COMMUNITY
Start: 2022-11-07

## 2022-11-21 RX ORDER — SPIRONOLACTONE 25 MG/1
12.5 TABLET ORAL DAILY
Qty: 15 TABLET | Refills: 11 | Status: SHIPPED | OUTPATIENT
Start: 2022-11-21 | End: 2023-01-11 | Stop reason: SINTOL

## 2022-11-21 NOTE — PROGRESS NOTES
Subjective   Anastacia Loomis is a 77 y.o. female     Chief Complaint   Patient presents with   • Follow-up     Here for 6 mo. F/u   • Hyperlipidemia   • Hypertension   • Palpitations   • Chest Pain   • Shortness of Breath       PROBLEM LIST:        0. CAD, severe 3 vessel per CT chest at  12-3-2020  0.1 Dobutamine stress echo, 4-8-2022, no ischemia,   There is very mild inferobasilar and diaphragmatic hypokinesis at baseline with normal augmentation of wall motion and wall thickening in all segments.  There was an increase in global left ventricular ejection fraction and decrease in left ventricular end-systolic dimensions.  1. HTN  2. Hyperlipidemia  3. DM, type 2  4. Hx Breast CA, left s/p chemo./rad. partial mastect. Followed by [facility]  5. Asthma  6. Palpitations  7. Anemia  8. Lymphedema left arm/hand  9. Chronic smoker  10. Echo, 3-, EF 50+-%, mod. LVH, grade 1 DD, minimal mitral leaflet sclerosis, trivial-mild MR, trivial TR, trivial post and post. Lateral pericardial effusion, pulm. Pressures 30 mmHg    Specialty Problems        Cardiology Problems    Essential hypertension        Mixed hyperlipidemia        Palpitations             HPI:  Ms. Loomis returns for follow-up on the above.    She has been significantly more short of breath and less able physically since she contracted COVID.  However, she feels that she is gradually regaining prior levels of stamina and breathing although she has not reached her baseline.  She also describes that she has had increased right lower extremity edema for the last several months.  She has had no injury, she denies chills or fevers, and she has minimal tenderness.    Ms. Loomis has bilateral shoulder pain which by description is clearly orthopedic in nature, but she denies chest pain, orthopnea, or PND.  She has sensed extrasystoles but she describes no sustained tachypalpitations, she has mild positional dizziness but no presyncope or syncope.   She does not claudicate she has had no symptoms of TIA or stroke.    Blood pressures generally run at home about what was measured in the office today.                        PRIOR MEDICATIONS    Current Outpatient Medications on File Prior to Visit   Medication Sig Dispense Refill   • albuterol (PROVENTIL) (2.5 MG/3ML) 0.083% nebulizer solution prn     • albuterol sulfate  (90 Base) MCG/ACT inhaler prn     • amLODIPine (NORVASC) 5 MG tablet Take 1 tablet by mouth Daily. 30 tablet 11   • aspirin (aspirin) 81 MG EC tablet Take 1 tablet by mouth Daily. 90 tablet 3   • doxycycline (VIBRAMYCIN) 100 MG capsule Take 100 mg by mouth 2 (Two) Times a Day.     • furosemide (LASIX) 20 MG tablet Take 1 tablet by mouth Daily. 30 tablet 11   • glyburide (DIAbeta) 5 MG tablet 10 mg 2 (Two) Times a Day.     • guaiFENesin (MUCINEX) 600 MG 12 hr tablet Take 600 mg by mouth At Night As Needed for Cough.     • Januvia 100 MG tablet Daily.     • losartan (COZAAR) 100 MG tablet 50 mg Daily.     • metFORMIN (GLUCOPHAGE) 1000 MG tablet Take 1,000 mg by mouth 2 (Two) Times a Day.     • metoprolol tartrate (LOPRESSOR) 25 MG tablet Take 1 tablet by mouth 2 (Two) Times a Day. 60 tablet 11   • rosuvastatin (CRESTOR) 40 MG tablet Every Night.     • sertraline (ZOLOFT) 25 MG tablet Take 75 mg by mouth Daily.     • [DISCONTINUED] fexofenadine (ALLEGRA) 180 MG tablet Take 180 mg by mouth Daily.       No current facility-administered medications on file prior to visit.       ALLERGIES:    Penicillins, Propoxyphene, Sterols (pine), Jardiance [empagliflozin], Meperidine, Prednisone, and Sulfamethoxazole    PAST MEDICAL HISTORY:    Past Medical History:   Diagnosis Date   • Anemia    • Arthritis     shoulder on left and right hip   • Asthma    • Cancer (HCC)     left breast, chemo. rad. partial mastect.   • Diabetes mellitus (HCC)    • History of Holter monitoring    • History of ulceration    • Hyperlipidemia    • Hypertension    • Irregular  "heart rhythm    • Lymphedema    • Menopause        SURGICAL HISTORY:    Past Surgical History:   Procedure Laterality Date   • HYSTERECTOMY     • LYMPHADENECTOMY     • MASTECTOMY, PARTIAL      left   • SINUS SURGERY     • TUMOR REMOVAL         SOCIAL HISTORY:    Social History     Socioeconomic History   • Marital status:    Tobacco Use   • Smoking status: Every Day     Packs/day: 0.50     Types: Cigarettes   • Smokeless tobacco: Never   • Tobacco comments:     undecided on quiting   Substance and Sexual Activity   • Alcohol use: Never   • Drug use: Never   • Sexual activity: Defer       FAMILY HISTORY:    Family History   Problem Relation Age of Onset   • Heart attack Mother    • Rheumatic fever Mother    • Stroke Father    • Heart attack Brother        Review of Systems   Constitutional: Positive for fatigue (covid 2 weeks ago).   HENT: Negative.    Eyes: Positive for visual disturbance (glasses).   Respiratory: Negative.    Cardiovascular: Positive for palpitations and leg swelling. Negative for chest pain.   Gastrointestinal: Negative.    Endocrine: Negative.    Genitourinary: Negative.    Musculoskeletal: Positive for arthralgias and myalgias.   Skin: Negative.    Allergic/Immunologic: Positive for environmental allergies.   Neurological: Negative.    Hematological: Bruises/bleeds easily.   Psychiatric/Behavioral: Negative.        VISIT VITALS:  Vitals:    11/21/22 1136   BP: 144/60   BP Location: Right arm   Patient Position: Sitting   Pulse: 66   SpO2: 95%   Weight: 88.9 kg (196 lb)   Height: 165 cm (64.96\")      /60 (BP Location: Right arm, Patient Position: Sitting)   Pulse 66   Ht 165 cm (64.96\")   Wt 88.9 kg (196 lb)   SpO2 95%   BMI 32.66 kg/m²     RECENT LABS:    Objective       Physical Exam  Vitals and nursing note reviewed.   Constitutional:       General: She is not in acute distress.     Appearance: She is well-developed.   HENT:      Head: Normocephalic and atraumatic.   Eyes:    "   Conjunctiva/sclera: Conjunctivae normal.      Pupils: Pupils are equal, round, and reactive to light.   Neck:      Vascular: No carotid bruit, hepatojugular reflux or JVD.      Trachea: No tracheal deviation.      Comments: Nl. Carotid upstrokes  Cardiovascular:      Rate and Rhythm: Normal rate and regular rhythm.      Pulses:           Radial pulses are 2+ on the right side and 2+ on the left side.      Heart sounds: Heart sounds are distant (S1 & S2). No murmur heard.    No friction rub. Gallop present. S4 sounds present. No S3 sounds.   Pulmonary:      Effort: Pulmonary effort is normal.      Breath sounds: Examination of the right-lower field reveals rhonchi. Examination of the left-lower field reveals rhonchi. Rhonchi present. No wheezing or rales.      Comments: Dullness left base  Mildly depressed breath sounds  No consolidation    Abdominal:      General: Bowel sounds are normal. There is no distension or abdominal bruit.      Palpations: Abdomen is soft. There is no mass.      Tenderness: There is no abdominal tenderness. There is no guarding or rebound.      Comments: No organomegaly   Musculoskeletal:         General: No tenderness or deformity. Normal range of motion.      Cervical back: Normal range of motion and neck supple.      Right lower leg: Edema present.      Left lower leg: Edema present.      Comments: LLE, trace-1+ edema at the ankle, unable to palpate pedal pulses  RLE, 1 1/2+ edema to lower calf, palpable DP pedal pulse   Skin:     General: Skin is warm and dry.      Coloration: Skin is not pale.      Findings: No erythema or rash.   Neurological:      Mental Status: She is alert and oriented to person, place, and time.   Psychiatric:         Behavior: Behavior normal.         Thought Content: Thought content normal.         Judgment: Judgment normal.         Procedures      Assessment & Plan   #1.  Right lower extremity edema.  Particularly given the patient's bout with COVID I would  like to perform venous duplex study to exclude DVT as a cause.  In the interim we will pursue mechanical measures with compression hose, leg elevation, and increased water intake.  Also, the patient has been limited in taking Lasix because of nocturnal leg cramps.  Therefore, I would like to add Aldactone 12.5 mg daily to see if we can increase diuretic effect of medications while we are pursuing the above.    2.  Systemic hypertension.  We will change metoprolol to carvedilol which may provide improved blood pressure control along with Aldactone.  Additionally, carvedilol will be neutral from the standpoint of Leukos metabolism.    3.  Palpitations.  These are minimally symptomatic, or stable over time.  The patient has no symptoms to suggest sustained tacky dysrhythmia disease the patient had nonsustained V. tach on prior event monitoring.    4.  Shoulder pain.  Although CT scan showed multivessel coronary disease dobutamine stress echo showed no evidence of ischemia.  We will continue risk factor modification.    5.  We will check a basic metabolic panel and magnesium in 2 to 3 weeks and we will plan on seeing the patient in follow-up after the holidays or on appearing basis as discussed.   Diagnosis Plan   1. Essential hypertension        2. Mixed hyperlipidemia        3. Palpitations        4. Precordial pain        5. Shortness of breath        6. Smoker            No follow-ups on file.         Anastacia Loomis  reports that she has been smoking cigarettes. She has been smoking an average of .5 packs per day. She has never used smokeless tobacco.. I have educated her on the risk of diseases from using tobacco products such as cancer, COPD and heart disease.     I advised her to quit and she is not willing to quit.    I spent 3  minutes counseling the patient.          BMI is >= 30 and <35. (Class 1 Obesity). The following options were offered after discussion;: pcp addressing     Advance Care Planning   ACP  discussion was held with the patient during this visit. Patient does not have an advance directive, declines further assistance.           Electronically signed by:    Scribed for Carmelo Block MD by Lorena Garay LPN on November 21, 2022  at 11:41 EST    I, Carmelo Block MD personally performed the services described in this documentation as scribed by the above named individual in my presence, and it is both accurate and complete. November 21, 2022 11:41 EST      Dictated Utilizing Dragon Dictation: Part of this note may be an electronic transcription/translation of spoken language to printed text using the Dragon Dictation System.

## 2022-12-05 ENCOUNTER — TELEPHONE (OUTPATIENT)
Dept: CARDIOLOGY | Facility: CLINIC | Age: 77
End: 2022-12-05

## 2022-12-05 NOTE — TELEPHONE ENCOUNTER
CALLED DAUGHTER TO DISCUSS DAD'S INR AND STATES SINCE STARTING BOTH THE COREG AND ALDACTONE SHE STARTED ITCHING REALLY BAD. STOPPED THE ALDACTONE AND SX'S RESOLVED. TOLD IZZY I WOULD DISCUSS WITH DR. BLOCK AND LET HER KNOW IF ANY CHANGED NEEDED TO BE MADE. PIETRO HALL    12-8-2022 v/o per Dr. Block to stay off Aldactone. Attempted to call daughterizzy to just remind her, but had originally told her I would only call her back if changes needed.  PH,LPN

## 2022-12-07 LAB
BUN SERPL-MCNC: 30 MG/DL (ref 8–27)
BUN/CREAT SERPL: 34 (ref 12–28)
CALCIUM SERPL-MCNC: 9.5 MG/DL (ref 8.7–10.3)
CHLORIDE SERPL-SCNC: 103 MMOL/L (ref 96–106)
CO2 SERPL-SCNC: 24 MMOL/L (ref 20–29)
CREAT SERPL-MCNC: 0.89 MG/DL (ref 0.57–1)
EGFRCR SERPLBLD CKD-EPI 2021: 67 ML/MIN/1.73
GLUCOSE SERPL-MCNC: 255 MG/DL (ref 70–99)
MAGNESIUM SERPL-MCNC: 1.9 MG/DL (ref 1.6–2.3)
POTASSIUM SERPL-SCNC: 4.9 MMOL/L (ref 3.5–5.2)
SODIUM SERPL-SCNC: 140 MMOL/L (ref 134–144)

## 2023-01-11 ENCOUNTER — OFFICE VISIT (OUTPATIENT)
Dept: CARDIOLOGY | Facility: CLINIC | Age: 78
End: 2023-01-11
Payer: MEDICARE

## 2023-01-11 VITALS
HEIGHT: 65 IN | SYSTOLIC BLOOD PRESSURE: 148 MMHG | OXYGEN SATURATION: 98 % | BODY MASS INDEX: 32.59 KG/M2 | DIASTOLIC BLOOD PRESSURE: 67 MMHG | HEART RATE: 81 BPM | WEIGHT: 195.6 LBS

## 2023-01-11 DIAGNOSIS — R06.02 SHORTNESS OF BREATH: ICD-10-CM

## 2023-01-11 DIAGNOSIS — I10 ESSENTIAL HYPERTENSION: Primary | ICD-10-CM

## 2023-01-11 DIAGNOSIS — E78.2 MIXED HYPERLIPIDEMIA: ICD-10-CM

## 2023-01-11 DIAGNOSIS — F17.200 SMOKER: ICD-10-CM

## 2023-01-11 DIAGNOSIS — R60.0 BILATERAL LEG EDEMA: ICD-10-CM

## 2023-01-11 DIAGNOSIS — R07.2 PRECORDIAL PAIN: ICD-10-CM

## 2023-01-11 DIAGNOSIS — R00.2 PALPITATIONS: ICD-10-CM

## 2023-01-11 PROCEDURE — 99214 OFFICE O/P EST MOD 30 MIN: CPT | Performed by: INTERNAL MEDICINE

## 2023-01-11 RX ORDER — OLMESARTAN MEDOXOMIL 40 MG/1
40 TABLET ORAL DAILY
Qty: 30 TABLET | Refills: 11 | Status: SHIPPED | OUTPATIENT
Start: 2023-01-11

## 2023-01-11 RX ORDER — EPLERENONE 25 MG/1
25 TABLET, FILM COATED ORAL DAILY
Qty: 30 TABLET | Refills: 11 | Status: SHIPPED | OUTPATIENT
Start: 2023-01-11

## 2023-01-11 RX ORDER — FLUTICASONE PROPIONATE 50 MCG
1 SPRAY, SUSPENSION (ML) NASAL DAILY PRN
COMMUNITY

## 2023-01-11 RX ORDER — BROMFENAC 1.03 MG/ML
1 SOLUTION/ DROPS OPHTHALMIC DAILY
COMMUNITY
Start: 2023-01-06

## 2023-01-24 ENCOUNTER — TRANSCRIBE ORDERS (OUTPATIENT)
Dept: WOUND CARE | Facility: HOSPITAL | Age: 78
End: 2023-01-24
Payer: MEDICARE

## 2023-01-24 DIAGNOSIS — I89.0 LYMPHEDEMA: Primary | ICD-10-CM

## 2023-04-11 ENCOUNTER — OFFICE VISIT (OUTPATIENT)
Dept: CARDIOLOGY | Facility: CLINIC | Age: 78
End: 2023-04-11
Payer: MEDICARE

## 2023-04-11 VITALS
HEIGHT: 65 IN | HEART RATE: 62 BPM | WEIGHT: 191 LBS | BODY MASS INDEX: 31.82 KG/M2 | DIASTOLIC BLOOD PRESSURE: 57 MMHG | SYSTOLIC BLOOD PRESSURE: 129 MMHG | OXYGEN SATURATION: 98 %

## 2023-04-11 DIAGNOSIS — F17.200 SMOKER: ICD-10-CM

## 2023-04-11 DIAGNOSIS — R07.2 PRECORDIAL PAIN: ICD-10-CM

## 2023-04-11 DIAGNOSIS — I10 ESSENTIAL HYPERTENSION: Primary | ICD-10-CM

## 2023-04-11 DIAGNOSIS — R06.02 SHORTNESS OF BREATH: ICD-10-CM

## 2023-04-11 DIAGNOSIS — E78.2 MIXED HYPERLIPIDEMIA: ICD-10-CM

## 2023-04-11 DIAGNOSIS — R00.2 PALPITATIONS: ICD-10-CM

## 2023-04-11 PROCEDURE — 3078F DIAST BP <80 MM HG: CPT | Performed by: INTERNAL MEDICINE

## 2023-04-11 PROCEDURE — 3074F SYST BP LT 130 MM HG: CPT | Performed by: INTERNAL MEDICINE

## 2023-04-11 PROCEDURE — 99213 OFFICE O/P EST LOW 20 MIN: CPT | Performed by: INTERNAL MEDICINE

## 2023-04-11 NOTE — PROGRESS NOTES
"Subjective   Anastacia Loomis is a 78 y.o. female     Chief Complaint   Patient presents with   • Follow-up     Here for 3 mo. F/u   • Hyperlipidemia   • Hypertension   • Palpitations       PROBLEM LIST:        0. CAD, severe 3 vessel per CT chest at  12-3-2020  0.1 Dobutamine stress echo, 4-8-2022, no ischemia,   There is very mild inferobasilar and diaphragmatic hypokinesis at baseline with normal augmentation of wall motion and wall thickening in all segments.  There was an increase in global left ventricular ejection fraction and decrease in left ventricular end-systolic dimensions.  1. HTN  2. Hyperlipidemia  3. DM, type 2  4. Hx Breast CA, left s/p chemo./rad. partial mastect. Follow[facility] by [facility]  5. Asthma  6. Palpitations  7. Anemia  8. Lymphedema left arm/hand  9. Chronic smoker  10. Echo, 3-, EF 50+-%, mod. LVH, grade 1 DD, minimal mitral leaflet sclerosis, trivial-mild MR, trivial TR, trivial post and post. Lateral pericardial effusion, pulm. Pressures 30 mmHg    Specialty Problems        Cardiology Problems    Essential hypertension        Mixed hyperlipidemia        Palpitations             HPI:  Ms. Loomis returns for follow-up on the above.    She is tolerating eplerenone and she has had marked improvement in lower extremity edema.  She has lost over 4 pounds.  She describes that she will have nocturnal leg cramps and will hold Lasix for a day and symptoms will resolve.    Ms. Loomis continues to describe chest discomfort.  She has a \"emptiness\" or \"heaviness\" in her chest which she relates to grief from her 's recent death.  She has no exertional symptoms and her functional capacity, albeit limited, has not changed.  She has minimal palpitations which are at baseline.  She denies orthopnea or PND.  She has no lightheadedness or dizziness.  Blood pressures are improved from prior                      PRIOR MEDICATIONS    Current Outpatient Medications on File Prior to " Visit   Medication Sig Dispense Refill   • albuterol (PROVENTIL) (2.5 MG/3ML) 0.083% nebulizer solution prn     • albuterol sulfate  (90 Base) MCG/ACT inhaler prn     • amLODIPine (NORVASC) 5 MG tablet Take 1 tablet by mouth Daily. 30 tablet 11   • aspirin (aspirin) 81 MG EC tablet Take 1 tablet by mouth Daily. 90 tablet 3   • carvedilol (COREG) 25 MG tablet Take 1 tablet by mouth 2 (Two) Times a Day. 60 tablet 11   • eplerenone (INSPRA) 25 MG tablet Take 1 tablet by mouth Daily. 30 tablet 11   • fluticasone (FLONASE) 50 MCG/ACT nasal spray 1 spray into the nostril(s) as directed by provider Daily As Needed. prn     • furosemide (LASIX) 20 MG tablet Take 1 tablet by mouth Daily. (Patient taking differently: Take 1 tablet by mouth. Takes at least 4-5 x a week. Will skip due to leg cramps) 30 tablet 11   • glyburide (DIAbeta) 5 MG tablet 2 tablets 2 (Two) Times a Day.     • guaiFENesin (MUCINEX) 600 MG 12 hr tablet Take 1 tablet by mouth At Night As Needed for Cough.     • Januvia 100 MG tablet Daily.     • metFORMIN (GLUCOPHAGE) 1000 MG tablet Take 1 tablet by mouth 2 (Two) Times a Day.     • olmesartan (Benicar) 40 MG tablet Take 1 tablet by mouth Daily. 30 tablet 11   • rosuvastatin (CRESTOR) 40 MG tablet Every Night.     • sertraline (ZOLOFT) 25 MG tablet Take 3 tablets by mouth Daily.     • [DISCONTINUED] bromfenac sodium, Once-Daily, (BROMDAY) 0.09 % ophthalmic solution Administer 1 drop to the right eye Daily.       No current facility-administered medications on file prior to visit.       ALLERGIES:    Penicillins, Propoxyphene, Sterols (pine), Aldactone [spironolactone], Jardiance [empagliflozin], Meperidine, Prednisone, and Sulfamethoxazole    PAST MEDICAL HISTORY:    Past Medical History:   Diagnosis Date   • Anemia    • Arthritis     shoulder on left and right hip   • Asthma    • Cancer     left breast, chemo. rad. partial mastect.   • Diabetes mellitus    • History of Holter monitoring    • History  "of ulceration    • Hyperlipidemia    • Hypertension    • Irregular heart rhythm    • Lymphedema    • Menopause        SURGICAL HISTORY:    Past Surgical History:   Procedure Laterality Date   • HYSTERECTOMY     • LYMPHADENECTOMY     • MASTECTOMY, PARTIAL      left   • SINUS SURGERY     • TUMOR REMOVAL         SOCIAL HISTORY:    Social History     Socioeconomic History   • Marital status:    Tobacco Use   • Smoking status: Every Day     Packs/day: 0.50     Types: Cigarettes   • Smokeless tobacco: Never   • Tobacco comments:     undecided on quiting   Substance and Sexual Activity   • Alcohol use: Never   • Drug use: Never   • Sexual activity: Defer       FAMILY HISTORY:    Family History   Problem Relation Age of Onset   • Heart attack Mother    • Rheumatic fever Mother    • Stroke Father    • Heart attack Brother        Review of Systems   Constitutional: Positive for fatigue.   HENT: Negative.    Eyes: Positive for visual disturbance (glasses).   Respiratory: Positive for shortness of breath (with exertion).         Denies orthopnea/PND   Cardiovascular: Positive for chest pain, palpitations and leg swelling (improving).   Gastrointestinal: Negative.    Endocrine: Negative.    Genitourinary: Negative.    Musculoskeletal: Positive for arthralgias, back pain (lower) and myalgias.   Skin: Negative.    Allergic/Immunologic: Positive for environmental allergies.   Neurological: Positive for weakness (overall). Negative for dizziness, syncope and light-headedness.        Denies stroke like sx's   Hematological: Bruises/bleeds easily.   Psychiatric/Behavioral: Positive for sleep disturbance.       VISIT VITALS:  Vitals:    04/11/23 1118   BP: 129/57   BP Location: Left arm   Patient Position: Sitting   Pulse: 62   SpO2: 98%   Weight: 86.6 kg (191 lb)   Height: 165 cm (64.96\")      /57 (BP Location: Left arm, Patient Position: Sitting)   Pulse 62   Ht 165 cm (64.96\")   Wt 86.6 kg (191 lb)   SpO2 98%   BMI " 31.82 kg/m²     RECENT LABS:    Objective       Physical Exam  Vitals and nursing note reviewed.   Constitutional:       General: She is not in acute distress.     Appearance: She is well-developed.   HENT:      Head: Normocephalic and atraumatic.   Eyes:      Conjunctiva/sclera: Conjunctivae normal.      Pupils: Pupils are equal, round, and reactive to light.   Neck:      Vascular: No carotid bruit, hepatojugular reflux or JVD.      Trachea: No tracheal deviation.      Comments: Nl. Carotid upstrokes  Cardiovascular:      Rate and Rhythm: Normal rate and regular rhythm.      Pulses:           Radial pulses are 2+ on the right side and 2+ on the left side.      Heart sounds: S1 normal and S2 normal. Murmur heard.    Systolic murmur is present.    No friction rub. Gallop present. S4 sounds present.      Comments: 1/6 systolic ejection murmur RUSB  1/6 TR  Pulmonary:      Effort: Pulmonary effort is normal.      Breath sounds: Normal breath sounds. No wheezing, rhonchi or rales.      Comments: Nl. Expir. Phase  Mildly decreased Breath sound intensity    Abdominal:      General: Bowel sounds are normal. There is no distension or abdominal bruit.      Palpations: Abdomen is soft. There is no mass.      Tenderness: There is no abdominal tenderness. There is no guarding or rebound.      Comments: No organomegaly   Musculoskeletal:         General: No tenderness or deformity. Normal range of motion.      Cervical back: Normal range of motion and neck supple.      Right lower leg: Edema present.      Left lower leg: Edema present.      Comments: LLE, 1+ edema to lower calf, palpable pedal pulses  RLE, trace-1+ edema to lower calf and in the ankle, palpable pedal pulses   Skin:     General: Skin is warm and dry.      Coloration: Skin is not pale.      Findings: No erythema or rash.   Neurological:      Mental Status: She is alert and oriented to person, place, and time.   Psychiatric:         Behavior: Behavior normal.          Thought Content: Thought content normal.         Judgment: Judgment normal.         Procedures      Assessment & Plan   #1.  Coronary artery disease.  The patient had coronary artery disease by CTA but no evidence of ischemia on stress testing.  Current symptoms are not felt to be anginal in nature.  We will continue clinical monitoring and risk factor modification.    2.  Systemic hypertension.  Blood pressures are better controlled and we will continue as present.    3.  Lower extremity edema has improved significantly with eplerenone.  We will continue current medications and sodium restriction.    4.  Decreased functional capacity.  I believe this relates to physical deconditioning and chronic lung disease.  Ms. Loomis will try to increase her physical activity.  We also discussed walking cessation in some detail today.  The patient will make efforts at decreasing her cigarette consumption with ultimate goal of complete cessation of cigarette use.    5.  Ms. Loomis will follow with [facility] Candelaria as instructed we will plan on seeing her in follow-up in 6 months or on appearing basis as discussed.   Diagnosis Plan   1. Essential hypertension        2. Mixed hyperlipidemia        3. Palpitations        4. Precordial pain        5. Shortness of breath        6. Smoker            No follow-ups on file.         Anastacia Loomis  reports that she has been smoking cigarettes. She has been smoking an average of .5 packs per day. She has never used smokeless tobacco.. I have educated her on the risk of diseases from using tobacco products such as cancer, COPD and heart disease.     I advised her to quit and she is not willing to quit.    I spent 3  minutes counseling the patient.          BMI is >= 30 and <35. (Class 1 Obesity). The following options were offered after discussion;: pcp addressing       Advance Care Planning   ACP discussion was held with the patient during this visit. Patient does not have an advance  directive, declines further assistance.         Electronically signed by:    Scribed for Carmelo Block MD by Lorena Garay LPN on April 11, 2023  at 11:23 EDT    I, Carmelo Block MD personally performed the services described in this documentation as scribed by the above named individual in my presence, and it is both accurate and complete. April 11, 2023 11:23 EDT      Dictated Utilizing Dragon Dictation: Part of this note may be an electronic transcription/translation of spoken language to printed text using the Dragon Dictation System.

## 2023-04-12 ENCOUNTER — TELEPHONE (OUTPATIENT)
Dept: CARDIOLOGY | Facility: CLINIC | Age: 78
End: 2023-04-12
Payer: MEDICARE

## 2023-04-12 DIAGNOSIS — R06.02 SHORTNESS OF BREATH: Primary | ICD-10-CM

## 2023-04-12 DIAGNOSIS — R60.0 BILATERAL LEG EDEMA: ICD-10-CM

## 2023-04-12 NOTE — TELEPHONE ENCOUNTER
Pt's daughter, Galina, FRANCISCO JAVIERM stating pt was seen yesterday and that PCP didn't get the lab results to us. Stated PCP called and told pt to double Lasix and stop other diuretic due to elevated potassium.  Wanted to run that by Dr. Block and Pat before they make any changes as he is the prescribing provider.   #[phone]      Per chart review, we have not received lab results on pt.       I called PCP's office, I requested most recent lab results be faxed to us, I was told they would send them.

## 2023-04-13 RX ORDER — EPLERENONE 25 MG/1
TABLET, FILM COATED ORAL
Qty: 30 TABLET | Refills: 11
Start: 2023-04-13

## 2023-04-13 RX ORDER — FUROSEMIDE 20 MG/1
TABLET ORAL
Qty: 30 TABLET | Refills: 11
Start: 2023-04-13

## 2023-04-13 NOTE — TELEPHONE ENCOUNTER
Labs and chart reviewed with Dr. Block, K+ 5.5, and recommendations from family pcp regarding diuretics and repeat labs. V/o per Dr. Block to hold both lasix and Inspra 3 days then go to every other day on both meds and have B?P/MAG drawn in 10 days to 2 week. Called and discussed all of above in detail with daughter, Galina. Verbalized she understood and aware repeat labs orders to be mailed to her. PH,LPN

## 2023-04-18 ENCOUNTER — HOSPITAL ENCOUNTER (OUTPATIENT)
Dept: OCCUPATIONAL THERAPY | Facility: HOSPITAL | Age: 78
Setting detail: THERAPIES SERIES
Discharge: HOME | End: 2023-04-18
Payer: MEDICARE

## 2023-04-18 DIAGNOSIS — Z92.21 HISTORY OF CHEMOTHERAPY: ICD-10-CM

## 2023-04-18 DIAGNOSIS — I89.0 LYMPHEDEMA: Primary | ICD-10-CM

## 2023-04-18 DIAGNOSIS — Z92.3 HISTORY OF RADIATION THERAPY: ICD-10-CM

## 2023-04-18 DIAGNOSIS — D64.9 ANEMIA, UNSPECIFIED TYPE: ICD-10-CM

## 2023-04-18 DIAGNOSIS — Z90.12 HISTORY OF PARTIAL MASTECTOMY OF LEFT BREAST: ICD-10-CM

## 2023-04-18 DIAGNOSIS — Z85.9 HISTORY OF CANCER: ICD-10-CM

## 2023-04-18 DIAGNOSIS — J45.909 ASTHMA, UNSPECIFIED ASTHMA SEVERITY, UNSPECIFIED WHETHER COMPLICATED, UNSPECIFIED WHETHER PERSISTENT: ICD-10-CM

## 2023-04-18 PROCEDURE — 97166 OT EVAL MOD COMPLEX 45 MIN: CPT

## 2023-04-18 NOTE — THERAPY EVALUATION
Outpatient Occupational Therapy Lymphedema Initial Evaluation   Wolfgang     Patient Name: Anastacia Loomis  : 1945  MRN: [id number]  Today's Date: 2023      Visit Date: 2023    Patient Active Problem List   Diagnosis   • DM (diabetes mellitus), type 2   • Essential hypertension   • Lymphedema   • Breast cancer   • Mixed hyperlipidemia   • Smoker   • Asthma   • Anemia   • Precordial pain   • Shortness of breath   • Palpitations        Past Medical History:   Diagnosis Date   • Anemia    • Arthritis     shoulder on left and right hip   • Asthma    • Cancer     left breast, chemo. rad. partial mastect.   • Diabetes mellitus    • History of Holter monitoring    • History of ulceration    • Hyperlipidemia    • Hypertension    • Irregular heart rhythm    • Lymphedema    • Menopause         Past Surgical History:   Procedure Laterality Date   • HYSTERECTOMY     • LYMPHADENECTOMY     • MASTECTOMY, PARTIAL      left   • SINUS SURGERY     • TUMOR REMOVAL           Visit Dx:     ICD-10-CM ICD-9-CM   1. Lymphedema  I89.0 457.1   2. History of cancer  Z85.9 V10.90   3. History of radiation therapy  Z92.3 V15.3   4. History of chemotherapy  Z92.21 V87.41   5. History of partial mastectomy of left breast  Z90.12 V45.71   6. Anemia, unspecified type  D64.9 285.9   7. Asthma, unspecified asthma severity, unspecified whether complicated, unspecified whether persistent  J45.909 493.90        Patient History     Row Name 23 0900             History    Chief Complaint Tinglings;Swelling;Tightness;Fatigue/poor endurance;Headache;Impaired sensation;Joint stiffness;Joint swelling;Muscle weakness;Pain  -AB      Type of Pain Back pain;Upper Extremity / Arm  -AB      Date Current Problem(s) Began --  2012-1383; she was cleared of cancer in , and reports swelling began ~ 1-2 years after.  -AB      Brief Description of Current Complaint Pt. reports that swelling began around 3053-6637 which was 1-2 years post  clearance of breast cancer. She has daily pain in her back from arthritis, but also in her L shoulder from swelling and arm heaviness.  -AB      Previous treatment for THIS PROBLEM Other (comment)  Lymphedema treatment  -AB      Patient/Caregiver Goals Relieve pain;Return to prior level of function;Improve mobility;Improve strength;Know what to do to help the symptoms;Decrease swelling  -AB      Current Tobacco Use current every day smoker  -AB      Patient seeing anyone else for problem(s)? Oncology  -AB      How has patient tried to help current problem? compression, pumping, diet, elevation  -AB      Related/Recent Hospitalizations No  -AB      Are you or can you be pregnant No  -AB         Pain     Pain Location Back;Shoulder  -AB      Pain at Present 0  -AB      Pain at Best 0  -AB      Pain at Worst 10  -AB      Pain Frequency Other (Comment)  every day  -AB      Pain Description Discomfort;Aching;Tightness;Tingling;Pins and needles;Itching;Heaviness;Sharp;Shooting;Sore  -AB      What Performance Factors Make the Current Problem(s) WORSE? standing, ambulation, shoulder hanging  -AB      What Performance Factors Make the Current Problem(s) BETTER? sitting, elevation  -AB      Is your sleep disturbed? No  not to do w/ lymphedema  -AB      Difficulties at work? retired  -AB      Difficulties with ADL's? yes - buttons, tying shoes  -AB      Difficulties with recreational activities? yes  -AB         Fall Risk Assessment    Any falls in the past year: No  -AB         Services    Prior Rehab/Home Health Experiences Yes  -AB      When was the prior experience with Rehab/Home Health 2015 - hip  -AB      Are you currently receiving Home Health services No  -AB      Do you plan to receive Home Health services in the near future No  -AB         Daily Activities    Primary Language English  -AB      Are you able to read Yes  -AB      Are you able to write Yes  -AB      Patient is concerned about/has problems with Climbing  "Stairs;Coordination;Flexibility;Grasping objects lifting;Repetitive movements of the hand, arm, shoulder;Performing home management (household chores, shopping, care of dependents);Standing;Writing/grasping items with hand(s);Difficulty with self care (i.e. bathing, dressing, toileting:  -AB      Does patient have problems with the following? None  -AB      Pt Participated in POC and Goals Yes  -AB         Safety    Are you being hurt, hit, or frightened by anyone at home or in your life? No  -AB      Are you being neglected by a caregiver No  -AB      Have you had any of the following issues with N/A  -AB            User Key  (r) = Recorded By, (t) = Taken By, (c) = Cosigned By    Initials Name Provider Type    Devika Khalil, OT Occupational Therapist                 Lymphedema     Row Name 04/18/23 0900             Subjective Pain    Able to rate subjective pain? yes  -AB      Pre-Treatment Pain Level 0  -AB      Subjective Pain Comment denies pain  -AB         Subjective Comments    Subjective Comments Pt. presents for initial evaluation this date w/ s/s of lymphedema in LUE and abdomen/trunk. She reports that she was \"cleared\" of breast cancer in 2012. Approximately 1-2 years later swelling began. She has received lymphedema treatment before, but was unable to continue when her  became sick. He has since passed away. She states that she has a basic pump for use at home, but it is around 5-6 years old, and does not address all of the areas of swelling.  -AB         Lymphedema Assessment    Lymphedema Classification LUE:;Trunk:;secondary;stage 2 (Spontaneously Irreversible)  -AB      Lymphedema Cancer Related Sx left;other (comment);hysterectomy  partial mastectomy, lymph node removal  -AB      Lymph Nodes Removed # 4  -AB      Positive Lymph Nodes # 2  -AB      Stage of Cancer Stage III  III B  -AB      Chemo Received yes  -AB      Chemo Treatments #/Timeframe 32 treatments  -AB      Radiation " "Therapy Received yes  -AB      Radiation Treatments #/Timeframe 37 treatments  -AB      Infections or Cellulitis? no  -AB      Lymphedema Precautions anemia;asthma  -AB         LLIS - Physical Concerns    The amount of pain associated with my lymphedema is: 2  -AB      The amount of limb heaviness associated with my lymphedema is: 2  -AB      The amount of skin tightness associated with my lymphedema is: 3  -AB      The size of my swollen limb(s) seems: 4  -AB      Lymphedema affects the movement of my swollen limb(s): 3  -AB      The strength in my swollen limb(s) is: 2  -AB         LLIS - Psychosocial Concerns    Lymphedema affects my body image (i.e., \"how I think I look\"). 3  -AB      Lymphedema affects my socializing with others. 2  -AB      Lymphedema affects my intimate relations with spouse or partner (rate 0 if not applicable --  deferred  -AB      Lymphedema \"gets me down\" (i.e., depression, frustration, or anger) 2  -AB      I must rely on others for help due to my lymphedema. 2  -AB      I know what to do to manage my lymphedema 2  -AB         LLIS - Functional Concerns    Lymphedema affects my ability to perform self-care activities (i.e. eating, dressing, hygiene) 1  -AB      Lymphedema affects my ability to perform routine home or work-related activities. 1  -AB      Lymphedema affects my performance of preferred leisure activities. 1  -AB      Lymphedema affects proper fit of clothing/shoes 4  -AB      Lymphedema affects my sleep 0  -AB         Posture/Observations    Alignment Options Rounded shoulders  -AB      Rounded Shoulders Mild;Moderate  -AB         General ROM    GENERAL ROM COMMENTS LUE impd AROM ~50%, PROM impd ~15%  -AB         Lymphedema Edema Assessment    Ptting Edema Category By severity;By grade out of 4  -AB      Pitting Edema + 4/4;Severe  -AB      Stemmer Sign left:;positive  -AB      [place] Hump left:;positive  -AB         Skin Changes/Observations    Location/Assessment Upper " Extremity;Upper Quadrant  -AB      Upper Extremity Conditions left:;clean;dry  -AB      Upper Extremity Color/Pigment left:;hyperpigmented;P'eau d'orange  -AB      Upper Quadrant Conditions left:  -AB      Upper Quadrant Color/Pigment hyperpigmented;fibrosis  fibrosis - breast  -AB      Skin Observations Comment small scab area noted distal from elbow bend on forearm - pt. reports that her arm weeps from that area sometimes.  -AB         Lymphedema Sensation    Lymphedema Sensation Reports LUE:;tingling  -AB      Lymphedema Sensation Tests light touch  -AB      Lymphedema Light Touch WNL  -AB         Lymphedema Pulses/Capillary Refill    Lymphedema Pulses/Capillary Refill capillary refill  -AB      Capillary Refill upper extremity capillary refill  -AB      Upper Extremity Capillary Refill left:;less than 3 seconds  -AB         Lymphedema Measurements    Measurement Type(s) Quick Girth;Circumferential  -AB      Quick Girth Areas Upper extremities  -AB      Circumferential Areas Trunk  -AB         LUE Quick Girth (cm)    Axilla 43 cm  -AB      Mid upper arm 37.7 cm  -AB      Elbow 29.4 cm  -AB      Mid forearm 31.5 cm  -AB      Wrist crease 19.8 cm  -AB      Web space 23.5 cm  -AB      Met-heads 21 cm  -AB      Other 1 26.5 cm  -AB      Other 2 29.4 cm  -AB      LUE Quick Girth Total 261.8  -AB         Trunk Circumferential (cm)    Measurement Location 1 Axilla  -AB      Trunk 1 104 cm  -AB      Measurement Location 2 Below breast  -AB      Trunk 2 105.5 cm  -AB      Measurement Location 3 Navel  -AB      Trunk 3 109.5 cm  -AB      Trunk Circumferential Total 319 cm  -AB         Lymphedema Life Impact Scale Totals    A.  Total Q1 - Q17 (Do not include Q18) 34  -AB      B.  Total number of questions answered (Q1-Q17) 16  -AB      C. Divide A by B 2.13  -AB      D. Multiple C by 25 53.25  -AB            User Key  (r) = Recorded By, (t) = Taken By, (c) = Cosigned By    Initials Name Provider Type    Devika Khalil  MARTHA Capellan Occupational Therapist                        Therapy Education  Given: HEP  Program: New  How Provided: Verbal, Demonstration  Provided to: Patient, Other (comment) (daughter)  Level of Understanding: Verbalized         OT Goals     Row Name 04/18/23 1000          OT Short Term Goals    STG Date to Achieve 05/18/23  -AB     STG 1 Pt. will be familiar w/ precautions, skin care, and self management of lymphedema.  -AB     STG 2 Pt. will decrease edema to 3to4/4 to reduce risk of infection.  -AB     STG 3 Pt. will reduce overall girth by 3cm to decrease risk of infection.  -AB     STG 4 Pt. will be familiar w/ HEP to assist w/ improved lymphatic flow.  -AB        Long Term Goals    LTG Date to Achieve 07/18/23  -AB     LTG 1 Pt./family will be independent w/ short stretch compression bandaging for girth reduction.  -AB     LTG 2 Pt. will decrease edema to 3/4 to reduce risk of infection.  -AB     LTG 3 Pt. will reduce overall girth by 5cm to decrease risk of infection.  -AB     LTG 4 Pt./family will obtain and be independent w/ donning/doffing compression garment.  -AB     LTG 5 Pt. will be independent w/ lymphedema management and HEP.  -AB        Time Calculation    OT Goal Re-Cert Due Date 07/18/23  -AB           User Key  (r) = Recorded By, (t) = Taken By, (c) = Cosigned By    Initials Name Provider Type    Devika Khalil OT Occupational Therapist                 OT Assessment/Plan     Row Name 04/18/23 1030          OT Assessment    Functional Limitations Decreased safety during functional activities;Limitations in functional capacity and performance;Performance in leisure activities;Performance in self-care ADL;Limitation in home management;Limitations in community activities  -AB     Impairments Coordination;Dexterity;Edema;Endurance;Impaired flexibility;Impaired lymphatic circulation;Pain;Joint mobility;Range of motion;Sensation  -AB     OT Diagnosis Lymphedema  -AB     OT Rehab  Potential Good  -AB     Patient/caregiver participated in establishment of treatment plan and goals Yes  -AB     Patient would benefit from skilled therapy intervention Yes  -AB        OT Plan    OT Frequency 2x/week  -AB     Predicted Duration of Therapy Intervention (OT) 90 days  -AB     Planned CPT's? OT EVAL MOD COMPLEXITY: 05041;OT RE-EVAL: 39622;OT THER ACT EA 15 MIN: 92523KN;OT THER PROC EA 15 MIN: 47909HX;OT SELF CARE/MGMT/TRAIN 15 MIN: 87787;OT MANUAL THERAPY EA 15 MIN: 20534;OT VASOPNEUMATIC DEVICE: 09206  lymphedema management  -AB     Planned Therapy Interventions (Optional Details) home exercise program;patient/family education;manual therapy techniques;ROM (Range of Motion)  -AB     OT Plan Comments Pt. will benefit from skilled services to address s/s of lymphedema in LUE and abdomen/trunk, requiring tx 2x/week for approximately 90 days.  -AB           User Key  (r) = Recorded By, (t) = Taken By, (c) = Cosigned By    Initials Name Provider Type    Devika Khalil OT Occupational Therapist                          Time Calculation:   OT Start Time: 0855  OT Stop Time: 1015  OT Time Calculation (min): 80 min  OT Non-Billable Time (min): 75 min  Total Timed Code Minutes- OT: 80 minute(s)     Therapy Charges for Today     Code Description Service Date Service Provider Modifiers Qty    [id number]  OT EVAL MOD COMPLEXITY 4 4/18/2023 Devika Cantu OT GO, KX 1                    Devika Cantu OT  4/18/2023

## 2023-04-20 ENCOUNTER — HOSPITAL ENCOUNTER (OUTPATIENT)
Dept: OCCUPATIONAL THERAPY | Facility: HOSPITAL | Age: 78
Setting detail: THERAPIES SERIES
Discharge: HOME | End: 2023-04-20
Payer: MEDICARE

## 2023-04-20 DIAGNOSIS — D64.9 ANEMIA, UNSPECIFIED TYPE: ICD-10-CM

## 2023-04-20 DIAGNOSIS — Z92.21 HISTORY OF CHEMOTHERAPY: ICD-10-CM

## 2023-04-20 DIAGNOSIS — I89.0 LYMPHEDEMA: Primary | ICD-10-CM

## 2023-04-20 DIAGNOSIS — J45.909 ASTHMA, UNSPECIFIED ASTHMA SEVERITY, UNSPECIFIED WHETHER COMPLICATED, UNSPECIFIED WHETHER PERSISTENT: ICD-10-CM

## 2023-04-20 DIAGNOSIS — Z92.3 HISTORY OF RADIATION THERAPY: ICD-10-CM

## 2023-04-20 DIAGNOSIS — Z90.12 HISTORY OF PARTIAL MASTECTOMY OF LEFT BREAST: ICD-10-CM

## 2023-04-20 DIAGNOSIS — Z85.9 HISTORY OF CANCER: ICD-10-CM

## 2023-04-20 PROCEDURE — 97016 VASOPNEUMATIC DEVICE THERAPY: CPT

## 2023-04-20 PROCEDURE — 97535 SELF CARE MNGMENT TRAINING: CPT

## 2023-04-20 PROCEDURE — 97140 MANUAL THERAPY 1/> REGIONS: CPT

## 2023-04-20 NOTE — THERAPY TREATMENT NOTE
"Outpatient Occupational Therapy Lymphedema Treatment Note  JANET Goss     Patient Name: Anastacia Loomis  : 1945  MRN: [id number]  Today's Date: 2023      Visit Date: 2023    Patient Active Problem List   Diagnosis   • DM (diabetes mellitus), type 2   • Essential hypertension   • Lymphedema   • Breast cancer   • Mixed hyperlipidemia   • Smoker   • Asthma   • Anemia   • Precordial pain   • Shortness of breath   • Palpitations        Past Medical History:   Diagnosis Date   • Anemia    • Arthritis     shoulder on left and right hip   • Asthma    • Cancer     left breast, chemo. rad. partial mastect.   • Diabetes mellitus    • History of Holter monitoring    • History of ulceration    • Hyperlipidemia    • Hypertension    • Irregular heart rhythm    • Lymphedema    • Menopause         Past Surgical History:   Procedure Laterality Date   • HYSTERECTOMY     • LYMPHADENECTOMY     • MASTECTOMY, PARTIAL      left   • SINUS SURGERY     • TUMOR REMOVAL           Visit Dx:      ICD-10-CM ICD-9-CM   1. Lymphedema  I89.0 457.1   2. History of cancer  Z85.9 V10.90   3. History of radiation therapy  Z92.3 V15.3   4. History of chemotherapy  Z92.21 V87.41   5. History of partial mastectomy of left breast  Z90.12 V45.71   6. Anemia, unspecified type  D64.9 285.9   7. Asthma, unspecified asthma severity, unspecified whether complicated, unspecified whether persistent  J45.909 493.90        Lymphedema     Row Name 23 1400             Subjective Pain    Able to rate subjective pain? yes  -AB      Pre-Treatment Pain Level 2  -AB      Subjective Pain Comment achiness in shoulder  -AB         Subjective Comments    Subjective Comments Pt. reports \"aching\" shoulder this date.  -AB         Lymphedema Assessment    Lymphedema Classification LUE:;Trunk:;secondary;stage 2 (Spontaneously Irreversible)  -AB      Lymphedema Cancer Related Sx left;other (comment);hysterectomy  partial mastectomy, lymph node removal  -AB      " Lymph Nodes Removed # 4  -AB      Positive Lymph Nodes # 2  -AB      Chemo Received yes  -AB      Radiation Therapy Received yes  -AB      Infections or Cellulitis? no  -AB      Lymphedema Precautions anemia;asthma  -AB         Posture/Observations    Alignment Options Rounded shoulders  -AB      Rounded Shoulders Mild;Moderate  -AB         Lymphedema Edema Assessment    Ptting Edema Category By severity;By grade out of 4  -AB      Pitting Edema + 4/4;Severe  -AB      Stemmer Sign left:;positive  -AB      [place] Hump left:;positive  -AB         Skin Changes/Observations    Location/Assessment Upper Extremity;Upper Quadrant  -AB      Upper Extremity Conditions left:;clean;dry  -AB      Upper Extremity Color/Pigment left:;hyperpigmented;P'eau d'orange  -AB      Upper Quadrant Conditions left:  -AB      Upper Quadrant Color/Pigment hyperpigmented;fibrosis  fibrosis - breast  -AB         Lymphedema Sensation    Lymphedema Sensation Reports LUE:;tingling  -AB      Lymphedema Sensation Tests light touch  -AB      Lymphedema Light Touch WNL  -AB         Lymphedema Pulses/Capillary Refill    Lymphedema Pulses/Capillary Refill capillary refill  -AB      Capillary Refill upper extremity capillary refill  -AB      Upper Extremity Capillary Refill left:;less than 3 seconds  -AB         Lymphedema Measurements    Measurement Type(s) Quick Girth;Circumferential  -AB      Quick Girth Areas Upper extremities  -AB      Circumferential Areas Trunk  -AB         Trunk Circumferential (cm)    Measurement Location 1 Axilla  -AB      Measurement Location 2 Below breast  -AB      Measurement Location 3 Navel  -AB         Manual Lymphatic Drainage    Manual Lymphatic Drainage extremity treatment;opened regional lymph nodes;initial sequence  -AB      Initial Sequence short neck;shoulder collectors;supraclavicular;abdomen  -AB      Opened Regional Lymph Nodes axillary;inguinal;paraspinal  -AB      Extremity Treatment MLD to full limb  -AB       MLD to Full Limb LUE  -AB      Manual Lymphatic Drainage Comments vibration to LUE, breast, axilla  -AB      Manual Therapy MLD to LUE, abdomen, axilla, inguinal, shoulder collectors, paraspinals  -AB         Compression/Skin Care    Compression/Skin Care skin care;wrapping location;bandaging  -AB      Skin Care moisturizing lotion applied  -AB      Wrapping Location upper extremity  -AB      Wrapping Location UE left:;hand to axilla  -AB      Bandage Layers soft foam- 1/4 inch;short-stretch bandages (comment size/quantity);cotton elastic stocking- single layer (comment size)  -AB      Bandaging Technique circumferential/spiral;light compression;moderate compression  -AB      Compression/Skin Care Comments compression pump to abdomen and LUE  -AB            User Key  (r) = Recorded By, (t) = Taken By, (c) = Cosigned By    Initials Name Provider Type    Devika Khalil OT Occupational Therapist                                      Therapy Education  Given: HEP, Edema management, Symptoms/condition management, Bandaging/dressing change  Program: Reinforced  How Provided: Verbal, Demonstration  Provided to: Patient  Level of Understanding: Verbalized                Time Calculation:   OT Start Time: 1210  OT Stop Time: 1310  OT Time Calculation (min): 60 min  OT Non-Billable Time (min): 25 min  Total Timed Code Minutes- OT: 60 minute(s)     Therapy Charges for Today     Code Description Service Date Service Provider Modifiers Qty    22859392088  OT MANUAL THERAPY EA 15 MIN 4/20/2023 Devika Cantu OT ANDRE HARRISX 2    [id number]  OT VASOPNEUMAT DEVIC 1 OR MORE AREAS 4/20/2023 Devika Cantu OT STEVEN, ANDREX 1    [id number] HC OT SELF CARE/MGMT/TRAIN EA 15 MIN 4/20/2023 Devika Cantu OT ANDRE HARRISX 1                      Devika Cantu OT  4/20/2023

## 2023-04-24 ENCOUNTER — HOSPITAL ENCOUNTER (OUTPATIENT)
Dept: OCCUPATIONAL THERAPY | Facility: HOSPITAL | Age: 78
Setting detail: THERAPIES SERIES
Discharge: HOME | End: 2023-04-24
Payer: MEDICARE

## 2023-04-24 DIAGNOSIS — D64.9 ANEMIA, UNSPECIFIED TYPE: ICD-10-CM

## 2023-04-24 DIAGNOSIS — I89.0 LYMPHEDEMA: Primary | ICD-10-CM

## 2023-04-24 DIAGNOSIS — J45.909 ASTHMA, UNSPECIFIED ASTHMA SEVERITY, UNSPECIFIED WHETHER COMPLICATED, UNSPECIFIED WHETHER PERSISTENT: ICD-10-CM

## 2023-04-24 DIAGNOSIS — Z92.3 HISTORY OF RADIATION THERAPY: ICD-10-CM

## 2023-04-24 DIAGNOSIS — Z90.12 HISTORY OF PARTIAL MASTECTOMY OF LEFT BREAST: ICD-10-CM

## 2023-04-24 DIAGNOSIS — Z85.9 HISTORY OF CANCER: ICD-10-CM

## 2023-04-24 DIAGNOSIS — Z92.21 HISTORY OF CHEMOTHERAPY: ICD-10-CM

## 2023-04-24 PROCEDURE — 97016 VASOPNEUMATIC DEVICE THERAPY: CPT

## 2023-04-24 PROCEDURE — 97140 MANUAL THERAPY 1/> REGIONS: CPT

## 2023-04-24 PROCEDURE — 97535 SELF CARE MNGMENT TRAINING: CPT

## 2023-04-24 NOTE — THERAPY TREATMENT NOTE
Outpatient Occupational Therapy Lymphedema Treatment Note  JANET Goss     Patient Name: Anastacia Loomis  : 1945  MRN: [id number]  Today's Date: 2023      Visit Date: 2023    Patient Active Problem List   Diagnosis   • DM (diabetes mellitus), type 2   • Essential hypertension   • Lymphedema   • Breast cancer   • Mixed hyperlipidemia   • Smoker   • Asthma   • Anemia   • Precordial pain   • Shortness of breath   • Palpitations        Past Medical History:   Diagnosis Date   • Anemia    • Arthritis     shoulder on left and right hip   • Asthma    • Cancer     left breast, chemo. rad. partial mastect.   • Diabetes mellitus    • History of Holter monitoring    • History of ulceration    • Hyperlipidemia    • Hypertension    • Irregular heart rhythm    • Lymphedema    • Menopause         Past Surgical History:   Procedure Laterality Date   • HYSTERECTOMY     • LYMPHADENECTOMY     • MASTECTOMY, PARTIAL      left   • SINUS SURGERY     • TUMOR REMOVAL           Visit Dx:      ICD-10-CM ICD-9-CM   1. Lymphedema  I89.0 457.1   2. History of cancer  Z85.9 V10.90   3. History of radiation therapy  Z92.3 V15.3   4. History of chemotherapy  Z92.21 V87.41   5. History of partial mastectomy of left breast  Z90.12 V45.71   6. Anemia, unspecified type  D64.9 285.9   7. Asthma, unspecified asthma severity, unspecified whether complicated, unspecified whether persistent  J45.909 493.90        Lymphedema     Row Name 23 1600             Subjective Pain    Able to rate subjective pain? yes  -AB      Pre-Treatment Pain Level 2  -AB      Subjective Pain Comment L shoulder  -AB         Subjective Comments    Subjective Comments Pt. reports no new c/o or changes this date.  -AB         Lymphedema Assessment    Lymphedema Classification LUE:;Trunk:;secondary;stage 2 (Spontaneously Irreversible)  -AB      Lymphedema Cancer Related Sx left;other (comment);hysterectomy  partial mastectomy, lymph node removal  -AB      Lymph  Nodes Removed # 4  -AB      Positive Lymph Nodes # 2  -AB      Chemo Received yes  -AB      Radiation Therapy Received yes  -AB      Infections or Cellulitis? no  -AB      Lymphedema Precautions anemia;asthma  -AB         Posture/Observations    Alignment Options Rounded shoulders  -AB      Rounded Shoulders Mild;Moderate  -AB         Lymphedema Edema Assessment    Ptting Edema Category By severity;By grade out of 4  -AB      Pitting Edema + 4/4;Severe  -AB      [place] Sign left:;positive  -AB      [place] Hump left:;positive  -AB         Skin Changes/Observations    Location/Assessment Upper Extremity;Upper Quadrant  -AB      Upper Extremity Conditions left:;clean;dry  -AB      Upper Extremity Color/Pigment left:;hyperpigmented;P'eau d'orange  -AB      Upper Quadrant Conditions left:  -AB      Upper Quadrant Color/Pigment hyperpigmented;fibrosis  fibrosis - breast  -AB         Lymphedema Sensation    Lymphedema Sensation Reports LUE:;tingling  -AB      Lymphedema Sensation Tests light touch  -AB      Lymphedema Light Touch WNL  -AB         Lymphedema Pulses/Capillary Refill    Lymphedema Pulses/Capillary Refill capillary refill  -AB      Capillary Refill upper extremity capillary refill  -AB      Upper Extremity Capillary Refill left:;less than 3 seconds  -AB         Lymphedema Measurements    Measurement Type(s) Quick Girth;Circumferential  -AB      Quick Girth Areas Upper extremities  -AB      Circumferential Areas Trunk  -AB         Trunk Circumferential (cm)    Measurement Location 1 Axilla  -AB      Measurement Location 2 Below breast  -AB      Measurement Location 3 Navel  -AB         Manual Lymphatic Drainage    Manual Lymphatic Drainage extremity treatment;opened regional lymph nodes;initial sequence  -AB      Initial Sequence short neck;shoulder collectors;supraclavicular;abdomen  -AB      Opened Regional Lymph Nodes axillary;inguinal;paraspinal  -AB      Extremity Treatment MLD to full limb  -AB      MLD  to Full Limb LUE  -AB      Manual Lymphatic Drainage Comments vibration to LUE, axilla, trunk  -AB      Manual Therapy MLD to LUE, abdomen, axilla, inguinal, shoulder collectors, paraspinals  -AB         Compression/Skin Care    Compression/Skin Care skin care;wrapping location;bandaging  -AB      Skin Care moisturizing lotion applied  -AB      Wrapping Location upper extremity  -AB      Wrapping Location UE left:;hand to axilla  -AB      Bandage Layers soft foam- 1/4 inch;short-stretch bandages (comment size/quantity);cotton elastic stocking- single layer (comment size)  -AB      Bandaging Technique circumferential/spiral;moderate compression  -AB      Compression/Skin Care Comments compression pump to abdomen and LUE  -AB            User Key  (r) = Recorded By, (t) = Taken By, (c) = Cosigned By    Initials Name Provider Type    Devika Khalil OT Occupational Therapist                                      Therapy Education  Given: HEP, Edema management, Symptoms/condition management, Bandaging/dressing change  Program: Reinforced  How Provided: Verbal, Demonstration  Provided to: Patient  Level of Understanding: Verbalized                Time Calculation:   OT Start Time: 1015  OT Stop Time: 1130  OT Time Calculation (min): 75 min  OT Non-Billable Time (min): 25 min  Total Timed Code Minutes- OT: 75 minute(s)     Therapy Charges for Today     Code Description Service Date Service Provider Modifiers Qty    40648197910  OT MANUAL THERAPY EA 15 MIN 4/24/2023 Devika Cantu OT GO, KX 3    78023468921 HC OT SELF CARE/MGMT/TRAIN EA 15 MIN 4/24/2023 Devika Cantu OT GO, KX 1    [id number]  OT VASOPNEUMAT DEVIC 1 OR MORE AREAS 4/24/2023 Devika Cantu OT GO, KX 1                      Devika Cantu OT  4/24/2023

## 2023-04-27 ENCOUNTER — HOSPITAL ENCOUNTER (OUTPATIENT)
Dept: OCCUPATIONAL THERAPY | Facility: HOSPITAL | Age: 78
Setting detail: THERAPIES SERIES
Discharge: HOME | End: 2023-04-27
Payer: MEDICARE

## 2023-04-27 DIAGNOSIS — Z85.9 HISTORY OF CANCER: ICD-10-CM

## 2023-04-27 DIAGNOSIS — Z92.3 HISTORY OF RADIATION THERAPY: ICD-10-CM

## 2023-04-27 DIAGNOSIS — I89.0 LYMPHEDEMA: Primary | ICD-10-CM

## 2023-04-27 DIAGNOSIS — D64.9 ANEMIA, UNSPECIFIED TYPE: ICD-10-CM

## 2023-04-27 DIAGNOSIS — Z90.12 HISTORY OF PARTIAL MASTECTOMY OF LEFT BREAST: ICD-10-CM

## 2023-04-27 DIAGNOSIS — Z92.21 HISTORY OF CHEMOTHERAPY: ICD-10-CM

## 2023-04-27 PROCEDURE — 97535 SELF CARE MNGMENT TRAINING: CPT

## 2023-04-27 PROCEDURE — 97530 THERAPEUTIC ACTIVITIES: CPT

## 2023-04-27 PROCEDURE — 97140 MANUAL THERAPY 1/> REGIONS: CPT

## 2023-04-27 NOTE — THERAPY TREATMENT NOTE
Outpatient Occupational Therapy Lymphedema Treatment Note  JANET Goss     Patient Name: Anastacia Loomis  : 1945  MRN: [id number]  Today's Date: 2023      Visit Date: 2023    Patient Active Problem List   Diagnosis   • DM (diabetes mellitus), type 2   • Essential hypertension   • Lymphedema   • Breast cancer   • Mixed hyperlipidemia   • Smoker   • Asthma   • Anemia   • Precordial pain   • Shortness of breath   • Palpitations        Past Medical History:   Diagnosis Date   • Anemia    • Arthritis     shoulder on left and right hip   • Asthma    • Cancer     left breast, chemo. rad. partial mastect.   • Diabetes mellitus    • History of Holter monitoring    • History of ulceration    • Hyperlipidemia    • Hypertension    • Irregular heart rhythm    • Lymphedema    • Menopause         Past Surgical History:   Procedure Laterality Date   • HYSTERECTOMY     • LYMPHADENECTOMY     • MASTECTOMY, PARTIAL      left   • SINUS SURGERY     • TUMOR REMOVAL           Visit Dx:      ICD-10-CM ICD-9-CM   1. Lymphedema  I89.0 457.1   2. History of cancer  Z85.9 V10.90   3. History of radiation therapy  Z92.3 V15.3   4. History of chemotherapy  Z92.21 V87.41   5. History of partial mastectomy of left breast  Z90.12 V45.71   6. Anemia, unspecified type  D64.9 285.9        Lymphedema     Row Name 23 1100             Subjective Pain    Able to rate subjective pain? yes  -BC      Pre-Treatment Pain Level 2  -BC      Subjective Pain Comment L shoulder  -BC         Subjective Comments    Subjective Comments Pt. complains with her shoulder hurting.  -BC         Lymphedema Assessment    Lymphedema Classification LUE:;Trunk:;secondary;stage 2 (Spontaneously Irreversible)  -BC      Lymphedema Cancer Related Sx left;other (comment);hysterectomy  partial mastectomy, lymph node removal  -BC      Lymph Nodes Removed # 4  -BC      Positive Lymph Nodes # 2  -BC      Chemo Received yes  -BC      Radiation Therapy Received yes   -BC      Infections or Cellulitis? no  -BC      Lymphedema Precautions anemia;asthma  -BC         Posture/Observations    Alignment Options Rounded shoulders  -BC      Rounded Shoulders Mild;Moderate  -BC         Lymphedema Edema Assessment    Ptting Edema Category By severity;By grade out of 4  -BC      Pitting Edema + 4/4;Severe  -BC      Stemmer Sign left:;positive  -BC      [place] Hump left:;positive  -BC         Skin Changes/Observations    Location/Assessment Upper Extremity;Upper Quadrant  -BC      Upper Extremity Conditions left:;clean;dry  -BC      Upper Extremity Color/Pigment left:;hyperpigmented;P'eau d'orange  -BC      Upper Quadrant Conditions left:  -BC      Upper Quadrant Color/Pigment hyperpigmented;fibrosis  fibrosis - breast  -BC         Lymphedema Sensation    Lymphedema Sensation Reports LUE:;tingling  -BC      Lymphedema Sensation Tests light touch  -BC      Lymphedema Light Touch WNL  -BC         Lymphedema Pulses/Capillary Refill    Lymphedema Pulses/Capillary Refill capillary refill  -BC      Capillary Refill upper extremity capillary refill  -BC      Upper Extremity Capillary Refill left:;less than 3 seconds  -BC         Lymphedema Measurements    Measurement Type(s) Quick Girth;Circumferential  -BC      Quick Girth Areas Upper extremities  -BC      Circumferential Areas Trunk  -BC         LUE Quick Girth (cm)    Axilla 44.5 cm  -BC      Mid upper arm 36.7 cm  -BC      Elbow 28.5 cm  -BC      Mid forearm 30 cm  -BC      Wrist crease 20 cm  -BC      Web space 23.5 cm  -BC      Met-heads 20.3 cm  -BC      Other 1 26.3 cm  -BC      Other 2 29.4 cm  -BC      LUE Quick Girth Total 259.2  -BC         Trunk Circumferential (cm)    Measurement Location 1 Axilla  -BC      Measurement Location 2 Below breast  -BC      Measurement Location 3 Navel  -BC         Manual Lymphatic Drainage    Manual Lymphatic Drainage extremity treatment;opened regional lymph nodes;initial sequence  -BC      Initial  Sequence short neck;shoulder collectors;supraclavicular;abdomen  -BC      Opened Regional Lymph Nodes axillary;inguinal;paraspinal  -BC      Extremity Treatment MLD to full limb  -BC      MLD to Full Limb LUE  -BC      Manual Lymphatic Drainage Comments Vibration as tolerated  -BC      Manual Therapy MLD to LUE, abd., inguinals  -BC         Compression/Skin Care    Compression/Skin Care skin care;wrapping location;bandaging  -BC      Skin Care moisturizing lotion applied  -BC      Wrapping Location upper extremity  -BC      Wrapping Location UE left:;hand to axilla  -BC      Wrapping Comments Kinesiotape to L hand, posterior lower quadrant of L arm, L tricep and on posterior axillary anastomoses  -BC      Bandage Layers soft foam- 1/4 inch;short-stretch bandages (comment size/quantity);cotton elastic stocking- single layer (comment size)  -BC      Bandaging Technique circumferential/spiral;moderate compression  -BC      Compression/Skin Care Comments Compression pump x Abd., LUE.  -BC            User Key  (r) = Recorded By, (t) = Taken By, (c) = Cosigned By    Initials Name Provider Type    Amanda Eid, MARTHA Occupational Therapist                         OT Assessment/Plan     Row Name 04/27/23 9335          OT Assessment    Assessment Comments Pt. positioned in supported recline for manual lymph drainage, compression pump, skin care, kinesiotaping, multi layered bandaging of LUE.  -BC           User Key  (r) = Recorded By, (t) = Taken By, (c) = Cosigned By    Initials Name Provider Type    Amanda Eid OT Occupational Therapist                          Therapy Education  Given: Symptoms/condition management  Program: Reinforced  How Provided: Verbal  Provided to: Patient  Level of Understanding: Verbalized                Time Calculation:   OT Start Time: 1100  OT Stop Time: 1230  OT Time Calculation (min): 90 min  OT Non-Billable Time (min): 15 min     Therapy Charges for Today     Code Description  Service Date Service Provider Modifiers Qty    01542945167 HC OT MANUAL THERAPY EA 15 MIN 4/27/2023 Amanda Spencer OT GO 4    01867735218 HC OT SELF CARE/MGMT/TRAIN EA 15 MIN 4/27/2023 Amanda Spencer OT GO 1    [id number]  OT THERAPEUTIC ACT EA 15 MIN 4/27/2023 Amanda Spencer OT 59, GO 1                      Amanda Spencer OT  4/27/2023

## 2023-05-02 ENCOUNTER — APPOINTMENT (OUTPATIENT)
Dept: OCCUPATIONAL THERAPY | Facility: HOSPITAL | Age: 78
End: 2023-05-02
Payer: MEDICARE

## 2023-05-03 LAB
AMBIG ABBREV BMP8 DEFAULT: NORMAL
BUN SERPL-MCNC: 27 MG/DL (ref 8–27)
BUN/CREAT SERPL: 31 (ref 12–28)
CALCIUM SERPL-MCNC: 9.7 MG/DL (ref 8.7–10.3)
CHLORIDE SERPL-SCNC: 103 MMOL/L (ref 96–106)
CO2 SERPL-SCNC: 21 MMOL/L (ref 20–29)
CREAT SERPL-MCNC: 0.87 MG/DL (ref 0.57–1)
EGFRCR SERPLBLD CKD-EPI 2021: 68 ML/MIN/1.73
GLUCOSE SERPL-MCNC: 172 MG/DL (ref 70–99)
MAGNESIUM SERPL-MCNC: 1.8 MG/DL (ref 1.6–2.3)
POTASSIUM SERPL-SCNC: 4.7 MMOL/L (ref 3.5–5.2)
SODIUM SERPL-SCNC: 139 MMOL/L (ref 134–144)

## 2023-05-04 ENCOUNTER — HOSPITAL ENCOUNTER (OUTPATIENT)
Dept: OCCUPATIONAL THERAPY | Facility: HOSPITAL | Age: 78
Setting detail: THERAPIES SERIES
Discharge: HOME | End: 2023-05-04
Payer: MEDICARE

## 2023-05-04 DIAGNOSIS — D64.9 ANEMIA, UNSPECIFIED TYPE: ICD-10-CM

## 2023-05-04 DIAGNOSIS — Z90.12 HISTORY OF PARTIAL MASTECTOMY OF LEFT BREAST: ICD-10-CM

## 2023-05-04 DIAGNOSIS — Z92.21 HISTORY OF CHEMOTHERAPY: ICD-10-CM

## 2023-05-04 DIAGNOSIS — I89.0 LYMPHEDEMA: Primary | ICD-10-CM

## 2023-05-04 DIAGNOSIS — Z85.9 HISTORY OF CANCER: ICD-10-CM

## 2023-05-04 DIAGNOSIS — J45.909 ASTHMA, UNSPECIFIED ASTHMA SEVERITY, UNSPECIFIED WHETHER COMPLICATED, UNSPECIFIED WHETHER PERSISTENT: ICD-10-CM

## 2023-05-04 DIAGNOSIS — Z92.3 HISTORY OF RADIATION THERAPY: ICD-10-CM

## 2023-05-04 PROCEDURE — 97535 SELF CARE MNGMENT TRAINING: CPT

## 2023-05-04 PROCEDURE — 97140 MANUAL THERAPY 1/> REGIONS: CPT

## 2023-05-04 NOTE — THERAPY TREATMENT NOTE
Outpatient Occupational Therapy Lymphedema Treatment Note  JANET Goss     Patient Name: Anastacia Loomis  : 1945  MRN: [id number]  Today's Date: 2023      Visit Date: 2023    Patient Active Problem List   Diagnosis   • DM (diabetes mellitus), type 2   • Essential hypertension   • Lymphedema   • Breast cancer   • Mixed hyperlipidemia   • Smoker   • Asthma   • Anemia   • Precordial pain   • Shortness of breath   • Palpitations        Past Medical History:   Diagnosis Date   • Anemia    • Arthritis     shoulder on left and right hip   • Asthma    • Cancer     left breast, chemo. rad. partial mastect.   • Diabetes mellitus    • History of Holter monitoring    • History of ulceration    • Hyperlipidemia    • Hypertension    • Irregular heart rhythm    • Lymphedema    • Menopause         Past Surgical History:   Procedure Laterality Date   • HYSTERECTOMY     • LYMPHADENECTOMY     • MASTECTOMY, PARTIAL      left   • SINUS SURGERY     • TUMOR REMOVAL           Visit Dx:      ICD-10-CM ICD-9-CM   1. Lymphedema  I89.0 457.1   2. History of cancer  Z85.9 V10.90   3. History of radiation therapy  Z92.3 V15.3   4. History of chemotherapy  Z92.21 V87.41   5. History of partial mastectomy of left breast  Z90.12 V45.71   6. Anemia, unspecified type  D64.9 285.9   7. Asthma, unspecified asthma severity, unspecified whether complicated, unspecified whether persistent  J45.909 493.90        Lymphedema     Row Name 23 1200             Subjective Pain    Able to rate subjective pain? yes  -AB      Pre-Treatment Pain Level 2  -AB      Subjective Pain Comment L shoulder  -AB         Subjective Comments    Subjective Comments Pt. presents w/ no new c/o or changes this date.  -AB         Lymphedema Assessment    Lymphedema Classification LUE:;Trunk:;secondary;stage 2 (Spontaneously Irreversible)  -AB      Lymphedema Cancer Related Sx left;other (comment);hysterectomy  partial mastectomy, lymph node removal  -AB       Lymph Nodes Removed # 4  -AB      Positive Lymph Nodes # 2  -AB      Chemo Received yes  -AB      Radiation Therapy Received yes  -AB      Infections or Cellulitis? no  -AB      Lymphedema Precautions anemia;asthma  -AB         Posture/Observations    Alignment Options Rounded shoulders  -AB      Rounded Shoulders Mild;Moderate  -AB         Lymphedema Edema Assessment    Ptting Edema Category By severity;By grade out of 4  -AB      Pitting Edema + 4/4;Moderate;Severe  -AB      Stemmer Sign left:;positive  -AB      [place] Hump left:;positive  -AB         Skin Changes/Observations    Location/Assessment Upper Extremity;Upper Quadrant  -AB      Upper Extremity Conditions left:;clean;dry  -AB      Upper Extremity Color/Pigment left:;hyperpigmented;P'eau d'orange  -AB      Upper Quadrant Conditions left:  -AB      Upper Quadrant Color/Pigment hyperpigmented;fibrosis  fibrosis - breast  -AB         Lymphedema Sensation    Lymphedema Sensation Reports LUE:;tingling  -AB      Lymphedema Sensation Tests light touch  -AB      Lymphedema Light Touch WNL  -AB         Lymphedema Pulses/Capillary Refill    Lymphedema Pulses/Capillary Refill capillary refill  -AB      Capillary Refill upper extremity capillary refill  -AB      Upper Extremity Capillary Refill left:;less than 3 seconds  -AB         Lymphedema Measurements    Measurement Type(s) Quick Girth;Circumferential  -AB      Quick Girth Areas Upper extremities  -AB      Circumferential Areas Trunk  -AB         LUE Quick Girth (cm)    Axilla 43 cm  -AB      Mid upper arm 36.1 cm  -AB      Elbow 29 cm  -AB      Mid forearm 30.8 cm  -AB      Wrist crease 19.1 cm  -AB      Web space 21.6 cm  -AB      Met-heads 19.5 cm  -AB      Other 1 25 cm  -AB      Other 2 27.7 cm  -AB      LUE Quick Girth Total 251.8  -AB         Trunk Circumferential (cm)    Measurement Location 1 Axilla  -AB      Measurement Location 2 Below breast  -AB      Measurement Location 3 Navel  -AB          Manual Lymphatic Drainage    Manual Lymphatic Drainage extremity treatment;opened regional lymph nodes;initial sequence  -AB      Initial Sequence short neck;shoulder collectors;supraclavicular;abdomen  -AB      Opened Regional Lymph Nodes axillary;inguinal;paraspinal  -AB      Extremity Treatment MLD to full limb  -AB      MLD to Full Limb LUE  -AB      Manual Lymphatic Drainage Comments vibration to LUE, axilla  -AB      Manual Therapy MLD to LUE, abdomen, axilla, inguinal, shoulder collectors, paraspinals  -AB         Compression/Skin Care    Compression/Skin Care skin care;wrapping location;bandaging  -AB      Skin Care moisturizing lotion applied  -AB      Wrapping Location upper extremity  -AB      Wrapping Location UE left:;hand to axilla  -AB      Wrapping Comments kinesiotape to L shoulder  -AB      Bandage Layers soft foam- 1/4 inch;short-stretch bandages (comment size/quantity);cotton elastic stocking- single layer (comment size)  -AB      Bandaging Technique circumferential/spiral;moderate compression  -AB      Compression/Skin Care Comments compression pump to abdomen and LUE  -AB            User Key  (r) = Recorded By, (t) = Taken By, (c) = Cosigned By    Initials Name Provider Type    AB Devika Cantu OT Occupational Therapist                                      Therapy Education  Given: HEP, Edema management, Symptoms/condition management, Bandaging/dressing change  Program: Reinforced  How Provided: Verbal, Demonstration  Provided to: Patient  Level of Understanding: Verbalized                Time Calculation:   OT Start Time: 1125  OT Stop Time: 1235  OT Time Calculation (min): 70 min  OT Non-Billable Time (min): 25 min  Total Timed Code Minutes- OT: 70 minute(s)     Therapy Charges for Today     Code Description Service Date Service Provider Modifiers Qty    79367886723  OT MANUAL THERAPY EA 15 MIN 5/4/2023 Devika Cantu OT GO, KX 4    41948882096 HC OT SELF  CARE/MGMT/TRAIN EA 15 MIN 5/4/2023 Devika Cantu, OT GO, KX 1                      Devika Cantu OT  5/4/2023

## 2023-05-09 ENCOUNTER — HOSPITAL ENCOUNTER (OUTPATIENT)
Dept: OCCUPATIONAL THERAPY | Facility: HOSPITAL | Age: 78
Setting detail: THERAPIES SERIES
Discharge: HOME | End: 2023-05-09
Payer: MEDICARE

## 2023-05-09 DIAGNOSIS — J45.909 ASTHMA, UNSPECIFIED ASTHMA SEVERITY, UNSPECIFIED WHETHER COMPLICATED, UNSPECIFIED WHETHER PERSISTENT: ICD-10-CM

## 2023-05-09 DIAGNOSIS — Z92.3 HISTORY OF RADIATION THERAPY: ICD-10-CM

## 2023-05-09 DIAGNOSIS — I89.0 LYMPHEDEMA: Primary | ICD-10-CM

## 2023-05-09 DIAGNOSIS — Z92.21 HISTORY OF CHEMOTHERAPY: ICD-10-CM

## 2023-05-09 DIAGNOSIS — Z90.12 HISTORY OF PARTIAL MASTECTOMY OF LEFT BREAST: ICD-10-CM

## 2023-05-09 DIAGNOSIS — D64.9 ANEMIA, UNSPECIFIED TYPE: ICD-10-CM

## 2023-05-09 DIAGNOSIS — Z85.9 HISTORY OF CANCER: ICD-10-CM

## 2023-05-09 PROCEDURE — 97140 MANUAL THERAPY 1/> REGIONS: CPT

## 2023-05-09 PROCEDURE — 97535 SELF CARE MNGMENT TRAINING: CPT

## 2023-05-10 NOTE — SIGNIFICANT NOTE
Pt. has completed more than four weeks conservative treatment that has failed.  Pt. has worn compression of 20-30 mmHg daily, elevating her UE when she can. Pt. reports completing her routine home exercise program daily.  Pt. attempted to manage symptoms of lymphedema conservatively utilizing a basic Entre[employer] pump since 8-. Pt. reported that she could feel increased fluid in her abdomen and trunk after use of compression pump at lowest setting.  Pt. has managed her treatment to the best of her ability until recently when she has had increased pain and increased swelling.  It is due to this that  Pt. would benefit from a flexitouch pump due to address the  aforementioned issues.

## 2023-05-11 ENCOUNTER — HOSPITAL ENCOUNTER (OUTPATIENT)
Dept: OCCUPATIONAL THERAPY | Facility: HOSPITAL | Age: 78
Setting detail: THERAPIES SERIES
Discharge: HOME | End: 2023-05-11
Payer: MEDICARE

## 2023-05-11 DIAGNOSIS — E78.2 MIXED HYPERLIPIDEMIA: ICD-10-CM

## 2023-05-11 DIAGNOSIS — I89.0 LYMPHEDEMA: Primary | ICD-10-CM

## 2023-05-11 DIAGNOSIS — Z92.3 HISTORY OF RADIATION THERAPY: ICD-10-CM

## 2023-05-11 DIAGNOSIS — Z92.21 HISTORY OF CHEMOTHERAPY: ICD-10-CM

## 2023-05-11 DIAGNOSIS — D64.9 ANEMIA, UNSPECIFIED TYPE: ICD-10-CM

## 2023-05-11 DIAGNOSIS — J45.909 ASTHMA, UNSPECIFIED ASTHMA SEVERITY, UNSPECIFIED WHETHER COMPLICATED, UNSPECIFIED WHETHER PERSISTENT: ICD-10-CM

## 2023-05-11 DIAGNOSIS — Z90.12 HISTORY OF PARTIAL MASTECTOMY OF LEFT BREAST: ICD-10-CM

## 2023-05-11 DIAGNOSIS — Z85.9 HISTORY OF CANCER: ICD-10-CM

## 2023-05-11 PROCEDURE — 97530 THERAPEUTIC ACTIVITIES: CPT

## 2023-05-11 PROCEDURE — 97535 SELF CARE MNGMENT TRAINING: CPT

## 2023-05-11 PROCEDURE — 97140 MANUAL THERAPY 1/> REGIONS: CPT

## 2023-05-11 NOTE — THERAPY TREATMENT NOTE
Outpatient Occupational Therapy Lymphedema Treatment Note  [facility]     Patient Name: Anastacia Loomis  : 1945  MRN: [id number]  Today's Date: 2023      Visit Date: 2023    Patient Active Problem List   Diagnosis   • DM (diabetes mellitus), type 2   • Essential hypertension   • Lymphedema   • Breast cancer   • Mixed hyperlipidemia   • Smoker   • Asthma   • Anemia   • Precordial pain   • Shortness of breath   • Palpitations        Past Medical History:   Diagnosis Date   • Anemia    • Arthritis     shoulder on left and right hip   • Asthma    • Cancer     left breast, chemo. rad. partial mastect.   • Diabetes mellitus    • History of Holter monitoring    • History of ulceration    • Hyperlipidemia    • Hypertension    • Irregular heart rhythm    • Lymphedema    • Menopause         Past Surgical History:   Procedure Laterality Date   • HYSTERECTOMY     • LYMPHADENECTOMY     • MASTECTOMY, PARTIAL      left   • SINUS SURGERY     • TUMOR REMOVAL           Visit Dx:      ICD-10-CM ICD-9-CM   1. Lymphedema  I89.0 457.1   2. History of cancer  Z85.9 V10.90   3. History of radiation therapy  Z92.3 V15.3   4. History of chemotherapy  Z92.21 V87.41   5. History of partial mastectomy of left breast  Z90.12 V45.71   6. Anemia, unspecified type  D64.9 285.9   7. Asthma, unspecified asthma severity, unspecified whether complicated, unspecified whether persistent  J45.909 493.90   8. Mixed hyperlipidemia  E78.2 272.2        Lymphedema     Row Name 23 1200             Subjective Pain    Able to rate subjective pain? yes  -BC      Pre-Treatment Pain Level 0  -BC      Subjective Pain Comment Denies pain  -BC         Subjective Comments    Subjective Comments Pt.  with no complaints voiced.  -BC         Lymphedema Assessment    Lymphedema Classification LUE:;Trunk:;secondary;stage 2 (Spontaneously Irreversible)  -BC      Lymphedema Cancer Related Sx left;other (comment);hysterectomy  partial mastectomy, lymph  node removal  -BC      Lymph Nodes Removed # 4  -BC      Positive Lymph Nodes # 2  -BC      Chemo Received yes  -BC      Radiation Therapy Received yes  -BC      Infections or Cellulitis? no  -BC      Lymphedema Precautions anemia;asthma  -BC         Posture/Observations    Alignment Options Rounded shoulders  -BC      Rounded Shoulders Mild;Moderate  -BC         Lymphedema Edema Assessment    Ptting Edema Category By severity;By grade out of 4  -BC      Pitting Edema + 4/4;Moderate;Severe  -BC      [place] Sign left:;positive  -BC      [place] Hump left:;positive  -BC         Skin Changes/Observations    Location/Assessment Upper Extremity;Upper Quadrant  -BC      Upper Extremity Conditions left:;clean;dry  -BC      Upper Extremity Color/Pigment left:;hyperpigmented;P'eau d'orange  -BC      Upper Quadrant Conditions left:  -BC      Upper Quadrant Color/Pigment hyperpigmented;fibrosis  fibrosis - breast  -BC         Lymphedema Sensation    Lymphedema Sensation Reports LUE:;tingling  -BC      Lymphedema Sensation Tests light touch  -BC      Lymphedema Light Touch WNL  -BC         Lymphedema Pulses/Capillary Refill    Lymphedema Pulses/Capillary Refill capillary refill  -BC      Capillary Refill upper extremity capillary refill  -BC      Upper Extremity Capillary Refill left:;less than 3 seconds  -BC         Lymphedema Measurements    Measurement Type(s) Quick Girth;Circumferential  -BC      Quick Girth Areas Upper extremities  -BC      Circumferential Areas Trunk  -BC         LUE Quick Girth (cm)    Axilla 44 cm  -BC      Mid upper arm 36 cm  -BC      Elbow 29 cm  -BC      Mid forearm 31 cm  -BC      Wrist crease 19.1 cm  -BC      Web space 22.5 cm  -BC      Met-heads 20.9 cm  -BC      Other 1 24.5 cm  -BC      Other 2 28.6 cm  -BC      LUE Quick Girth Total 255.6  -BC         Trunk Circumferential (cm)    Measurement Location 1 Axilla  -BC      Trunk 1 96.2 cm  -BC      Measurement Location 2 Below breast  -BC       Trunk 2 108 cm  -BC      Measurement Location 3 Navel  -BC      Trunk 3 115 cm  -BC      Trunk Circumferential Total 319.2 cm  -BC         Manual Lymphatic Drainage    Manual Lymphatic Drainage extremity treatment;opened regional lymph nodes;initial sequence  -BC      Initial Sequence short neck;shoulder collectors;supraclavicular;abdomen  -BC      Opened Regional Lymph Nodes axillary;inguinal;paraspinal  -BC      Extremity Treatment MLD to full limb  -BC      MLD to Full Limb LUE  -BC      Manual Lymphatic Drainage Comments vibration as tolerated.  -BC      Manual Therapy MLD to LUE, abd., inguinals  -BC         Compression/Skin Care    Compression/Skin Care skin care;wrapping location;bandaging  -BC      Skin Care lotion applied  -BC      Wrapping Location upper extremity  -BC      Wrapping Location UE left:;hand to axilla  -BC      Wrapping Comments K-tape, carpal-cubital, cubital-axillary, POA  -BC      Bandage Layers soft foam- 1/4 inch;short-stretch bandages (comment size/quantity);cotton elastic stocking- single layer (comment size)  -BC      Bandaging Technique circumferential/spiral;moderate compression  -BC      Compression/Skin Care Comments Compression pump x Abd., LUE  -BC            User Key  (r) = Recorded By, (t) = Taken By, (c) = Cosigned By    Initials Name Provider Type    Amanda Eid OT Occupational Therapist                         OT Assessment/Plan     Row Name 05/11/23 2277          OT Assessment    Assessment Comments Pt. positioned in supported recline for manual lymph drainage, compression pump, skin care, k-taping and multi layered bandaging of LUE.  -BC           User Key  (r) = Recorded By, (t) = Taken By, (c) = Cosigned By    Initials Name Provider Type    Amanda Eid, OT Occupational Therapist                          Therapy Education  Given: Symptoms/condition management  Program: Reinforced  How Provided: Verbal  Provided to: Patient  Level of Understanding:  Verbalized                Time Calculation:   OT Start Time: 1100  OT Stop Time: 1230  OT Time Calculation (min): 90 min  OT Non-Billable Time (min): 15 min     Therapy Charges for Today     Code Description Service Date Service Provider Modifiers Qty    63654769890 HC OT SELF CARE/MGMT/TRAIN EA 15 MIN 5/11/2023 Amanda Spencer OT GO, KX 1    53249686593 HC OT MANUAL THERAPY EA 15 MIN 5/11/2023 Amanda Spencer OT GO KX 4    31331130968 HC OT THERAPEUTIC ACT EA 15 MIN 5/11/2023 Amanda Spencer OT GO KX 1                      Amanda Spencer OT  5/11/2023

## 2023-05-16 ENCOUNTER — HOSPITAL ENCOUNTER (OUTPATIENT)
Dept: OCCUPATIONAL THERAPY | Facility: HOSPITAL | Age: 78
Setting detail: THERAPIES SERIES
Discharge: HOME | End: 2023-05-16
Payer: MEDICARE

## 2023-05-16 DIAGNOSIS — Z90.12 HISTORY OF PARTIAL MASTECTOMY OF LEFT BREAST: ICD-10-CM

## 2023-05-16 DIAGNOSIS — I89.0 LYMPHEDEMA: Primary | ICD-10-CM

## 2023-05-16 DIAGNOSIS — Z92.3 HISTORY OF RADIATION THERAPY: ICD-10-CM

## 2023-05-16 DIAGNOSIS — Z92.21 HISTORY OF CHEMOTHERAPY: ICD-10-CM

## 2023-05-16 DIAGNOSIS — Z85.9 HISTORY OF CANCER: ICD-10-CM

## 2023-05-16 PROCEDURE — 97535 SELF CARE MNGMENT TRAINING: CPT

## 2023-05-16 PROCEDURE — 97140 MANUAL THERAPY 1/> REGIONS: CPT

## 2023-05-16 PROCEDURE — 97016 VASOPNEUMATIC DEVICE THERAPY: CPT

## 2023-05-16 NOTE — THERAPY PROGRESS REPORT/RE-CERT
Outpatient Occupational Therapy Lymphedema Progress Note  [facility]     Patient Name: Anastacia Loomis  : 1945  MRN: [id number]  Today's Date: 2023      Visit Date: 2023    Patient Active Problem List   Diagnosis   • DM (diabetes mellitus), type 2   • Essential hypertension   • Lymphedema   • Breast cancer   • Mixed hyperlipidemia   • Smoker   • Asthma   • Anemia   • Precordial pain   • Shortness of breath   • Palpitations        Past Medical History:   Diagnosis Date   • Anemia    • Arthritis     shoulder on left and right hip   • Asthma    • Cancer     left breast, chemo. rad. partial mastect.   • Diabetes mellitus    • History of Holter monitoring    • History of ulceration    • Hyperlipidemia    • Hypertension    • Irregular heart rhythm    • Lymphedema    • Menopause         Past Surgical History:   Procedure Laterality Date   • HYSTERECTOMY     • LYMPHADENECTOMY     • MASTECTOMY, PARTIAL      left   • SINUS SURGERY     • TUMOR REMOVAL           Visit Dx:      ICD-10-CM ICD-9-CM   1. Lymphedema  I89.0 457.1   2. History of cancer  Z85.9 V10.90   3. History of radiation therapy  Z92.3 V15.3   4. History of chemotherapy  Z92.21 V87.41   5. History of partial mastectomy of left breast  Z90.12 V45.71        Lymphedema     Row Name 23 1100             Subjective Pain    Able to rate subjective pain? yes  -AB      Pre-Treatment Pain Level 1  -AB      Subjective Pain Comment slight pain in L shoulder  -AB         Subjective Comments    Subjective Comments Pt. reports no new c/o or changes this date, only continued slight pain in L shoulder. She believes this is due to the weight of her LUE.  -AB         Lymphedema Assessment    Lymphedema Classification LUE:;Trunk:;secondary;stage 2 (Spontaneously Irreversible)  -AB      Lymphedema Cancer Related Sx left;other (comment);hysterectomy  partial mastectomy, lymph node removal  -AB      Lymph Nodes Removed # 4  -AB      Positive Lymph Nodes # 2  -AB       Chemo Received yes  -AB      Radiation Therapy Received yes  -AB      Infections or Cellulitis? no  -AB      Lymphedema Precautions anemia;asthma  -AB         Posture/Observations    Alignment Options Rounded shoulders  -AB      Rounded Shoulders Mild;Moderate  -AB         Lymphedema Edema Assessment    Ptting Edema Category By severity;By grade out of 4  -AB      Pitting Edema + 4/4;Moderate;Severe  -AB      [place]em[place] Sign left:;positive  -AB      [place] Hump left:;positive  -AB         Skin Changes/Observations    Location/Assessment Upper Extremity;Upper Quadrant  -AB      Upper Extremity Conditions left:;clean;dry  -AB      Upper Extremity Color/Pigment left:;hyperpigmented;P'eau d'orange  -AB      Upper Quadrant Conditions left:  -AB      Upper Quadrant Color/Pigment hyperpigmented;fibrosis  fibrosis - breast  -AB         Lymphedema Sensation    Lymphedema Sensation Reports LUE:;tingling  -AB      Lymphedema Sensation Tests light touch  -AB      Lymphedema Light Touch WNL  -AB         Lymphedema Pulses/Capillary Refill    Lymphedema Pulses/Capillary Refill capillary refill  -AB      Capillary Refill upper extremity capillary refill  -AB      Upper Extremity Capillary Refill left:;less than 3 seconds  -AB         Lymphedema Measurements    Measurement Type(s) Quick Girth;Circumferential  -AB      Quick Girth Areas Upper extremities  -AB      Circumferential Areas Trunk  -AB         LUE Quick Girth (cm)    Axilla 43 cm  -AB      Mid upper arm 35 cm  -AB      Elbow 29.1 cm  -AB      Mid forearm 30.9 cm  -AB      Wrist crease 19.8 cm  -AB      Web space 22.8 cm  -AB      Met-heads 20.4 cm  -AB      Other 1 25.5 cm  -AB      Other 2 28.8 cm  -AB      LUE Quick Girth Total 255.3  -AB         Trunk Circumferential (cm)    Measurement Location 1 Axilla  -AB      Trunk 1 96.2 cm  -AB      Measurement Location 2 Below breast  -AB      Trunk 2 108 cm  -AB      Measurement Location 3 Navel  -AB      Trunk 3 115 cm  -AB       Trunk Circumferential Total 319.2 cm  -AB         Manual Lymphatic Drainage    Manual Lymphatic Drainage extremity treatment;opened regional lymph nodes;initial sequence  -AB      Initial Sequence short neck;shoulder collectors;supraclavicular;abdomen  -AB      Opened Regional Lymph Nodes axillary;inguinal;paraspinal  -AB      Extremity Treatment MLD to full limb  -AB      MLD to Full Limb LUE  -AB      Manual Lymphatic Drainage Comments vibration to LUE, axilla, breast  -AB      Manual Therapy MLD to LUE, abdomen, axilla, inguinal, shoulder collectors, paraspinals  -AB         Compression/Skin Care    Compression/Skin Care skin care;wrapping location;bandaging  -AB      Skin Care lotion applied  -AB      Wrapping Location upper extremity  -AB      Wrapping Location UE left:;hand to axilla  -AB      Wrapping Comments kinesiotape to L dorsum of hand, forearm, triceps, shoulder  -AB      Bandage Layers soft foam- 1/4 inch;short-stretch bandages (comment size/quantity);cotton elastic stocking- single layer (comment size)  -AB      Bandaging Technique circumferential/spiral;moderate compression  -AB      Compression/Skin Care Comments compression pump to abdomen and LUE  -AB            User Key  (r) = Recorded By, (t) = Taken By, (c) = Cosigned By    Initials Name Provider Type    Devika Khalil, OT Occupational Therapist                         OT Assessment/Plan     Row Name 05/16/23 1548          OT Assessment    OT Rehab Potential Good  -AB     Patient/caregiver participated in establishment of treatment plan and goals Yes  -AB     Patient would benefit from skilled therapy intervention Yes  -AB        OT Plan    OT Frequency 2x/week  -AB     Predicted Duration of Therapy Intervention (OT) 30 days  -AB     Planned Therapy Interventions (Optional Details) patient/family education;home exercise program;manual therapy techniques  -AB     OT Plan Comments continue POC  -AB           User Key  (r) =  Recorded By, (t) = Taken By, (c) = Cosigned By    Initials Name Provider Type    Devika Khalil, OT Occupational Therapist                       OT Goals     Row Name 05/16/23 1500          OT Short Term Goals    STG Date to Achieve 06/18/23  -AB     STG 1 Pt. will be familiar w/ precautions, skin care, and self management of lymphedema.  -AB     STG 1 Progress Met  -AB     STG 2 Pt. will decrease edema to 3to4/4 to reduce risk of infection.  -AB     STG 2 Progress Progressing;Ongoing  -AB     STG 3 Pt. will reduce overall girth by 3cm to decrease risk of infection.  -AB     STG 3 Progress Met  -AB     STG 4 Pt. will be familiar w/ HEP to assist w/ improved lymphatic flow.  -AB     STG 4 Progress Met  -AB        Long Term Goals    LTG Date to Achieve 07/18/23  -AB     LTG 1 Pt./family will be independent w/ short stretch compression bandaging for girth reduction.  -AB     LTG 1 Progress Ongoing  -AB     LTG 2 Pt. will decrease edema to 3/4 to reduce risk of infection.  -AB     LTG 2 Progress Ongoing  -AB     LTG 3 Pt. will reduce overall girth by 5cm to decrease risk of infection.  -AB     LTG 3 Progress Met  -AB     LTG 4 Pt./family will obtain and be independent w/ donning/doffing compression garment.  -AB     LTG 4 Progress Ongoing  -AB     LTG 5 Pt. will be independent w/ lymphedema management and HEP.  -AB     LTG 5 Progress Ongoing  -AB        Time Calculation    OT Goal Re-Cert Due Date 07/18/23  -AB           User Key  (r) = Recorded By, (t) = Taken By, (c) = Cosigned By    Initials Name Provider Type    Devika Khalil OT Occupational Therapist                Therapy Education  Given: HEP, Edema management, Symptoms/condition management, Bandaging/dressing change  Program: Reinforced  How Provided: Verbal, Demonstration  Provided to: Patient  Level of Understanding: Verbalized                Time Calculation:   OT Start Time: 1115  OT Stop Time: 1225  OT Time Calculation (min): 70  min  OT Non-Billable Time (min): 35 min  Total Timed Code Minutes- OT: 70 minute(s)     Therapy Charges for Today     Code Description Service Date Service Provider Modifiers Qty    77083889822 HC OT MANUAL THERAPY EA 15 MIN 5/16/2023 Devika Cantu OT GO, KX 3    40765776540 HC OT SELF CARE/MGMT/TRAIN EA 15 MIN 5/16/2023 Devika Cantu OT GO, KX 1    [id number]  OT VASOPNEUMAT DEVIC 1 OR MORE AREAS 5/16/2023 Devika Cantu OT GO KX 1                      Devika Cantu OT  5/16/2023

## 2023-05-18 ENCOUNTER — HOSPITAL ENCOUNTER (OUTPATIENT)
Dept: OCCUPATIONAL THERAPY | Facility: HOSPITAL | Age: 78
Setting detail: THERAPIES SERIES
Discharge: HOME | End: 2023-05-18
Payer: MEDICARE

## 2023-05-18 ENCOUNTER — TELEPHONE (OUTPATIENT)
Dept: CARDIOLOGY | Facility: CLINIC | Age: 78
End: 2023-05-18
Payer: MEDICARE

## 2023-05-18 DIAGNOSIS — Z92.21 HISTORY OF CHEMOTHERAPY: ICD-10-CM

## 2023-05-18 DIAGNOSIS — I89.0 LYMPHEDEMA: Primary | ICD-10-CM

## 2023-05-18 DIAGNOSIS — Z85.9 HISTORY OF CANCER: ICD-10-CM

## 2023-05-18 DIAGNOSIS — R60.0 BILATERAL LEG EDEMA: Primary | ICD-10-CM

## 2023-05-18 DIAGNOSIS — R06.02 SHORTNESS OF BREATH: ICD-10-CM

## 2023-05-18 DIAGNOSIS — J45.909 ASTHMA, UNSPECIFIED ASTHMA SEVERITY, UNSPECIFIED WHETHER COMPLICATED, UNSPECIFIED WHETHER PERSISTENT: ICD-10-CM

## 2023-05-18 DIAGNOSIS — D64.9 ANEMIA, UNSPECIFIED TYPE: ICD-10-CM

## 2023-05-18 DIAGNOSIS — Z90.12 HISTORY OF PARTIAL MASTECTOMY OF LEFT BREAST: ICD-10-CM

## 2023-05-18 DIAGNOSIS — Z92.3 HISTORY OF RADIATION THERAPY: ICD-10-CM

## 2023-05-18 PROCEDURE — 97139 UNLISTED THERAPEUTIC PX: CPT

## 2023-05-18 PROCEDURE — 97016 VASOPNEUMATIC DEVICE THERAPY: CPT

## 2023-05-18 PROCEDURE — 97140 MANUAL THERAPY 1/> REGIONS: CPT

## 2023-05-18 RX ORDER — FUROSEMIDE 20 MG/1
TABLET ORAL
Qty: 30 TABLET | Refills: 11
Start: 2023-05-18

## 2023-05-18 RX ORDER — EPLERENONE 25 MG/1
TABLET, FILM COATED ORAL
Qty: 30 TABLET | Refills: 11
Start: 2023-05-18

## 2023-05-18 NOTE — TELEPHONE ENCOUNTER
From   Carmelo Block MD To   Lorena Garay LPN Sent   5/18/2023  2:07 PM       Good afternoon,     I wanted to follow-up regarding my recent labs that I had to check my potassium levels.  We had changed my diuretic medication due to my potassium levels being too high and I was showing slight dehydration.  Since I had the follow-up labs I have not had any correspondence, do I need to continue taking the diuretics every other day?     Thanks     Carla            Change Lasix to QOD and Inspra QOD and repeat BMP / Mag in 2 weeks.           Previous [facility]    SPOKE TO DAUGHTER, EMANUEL, AND SHE IS AWARE TO MAKE DIURETIC CHANGES PER DR. BLOCK'S RECOMMENDATION AND REQUESTED REPEAT LAB ORDERS BE MAILED TO HER. MAILED. PH,LPN

## 2023-05-23 ENCOUNTER — APPOINTMENT (OUTPATIENT)
Dept: OCCUPATIONAL THERAPY | Facility: HOSPITAL | Age: 78
End: 2023-05-23
Payer: MEDICARE

## 2023-05-25 ENCOUNTER — APPOINTMENT (OUTPATIENT)
Dept: OCCUPATIONAL THERAPY | Facility: HOSPITAL | Age: 78
End: 2023-05-25
Payer: MEDICARE

## 2023-06-02 ENCOUNTER — HOSPITAL ENCOUNTER (OUTPATIENT)
Dept: OCCUPATIONAL THERAPY | Facility: HOSPITAL | Age: 78
Setting detail: THERAPIES SERIES
Discharge: HOME | End: 2023-06-02
Payer: MEDICARE

## 2023-06-02 DIAGNOSIS — D64.9 ANEMIA, UNSPECIFIED TYPE: ICD-10-CM

## 2023-06-02 DIAGNOSIS — E78.2 MIXED HYPERLIPIDEMIA: ICD-10-CM

## 2023-06-02 DIAGNOSIS — Z92.3 HISTORY OF RADIATION THERAPY: ICD-10-CM

## 2023-06-02 DIAGNOSIS — Z90.12 HISTORY OF PARTIAL MASTECTOMY OF LEFT BREAST: ICD-10-CM

## 2023-06-02 DIAGNOSIS — Z85.9 HISTORY OF CANCER: ICD-10-CM

## 2023-06-02 DIAGNOSIS — I89.0 LYMPHEDEMA: Primary | ICD-10-CM

## 2023-06-02 DIAGNOSIS — J45.909 ASTHMA, UNSPECIFIED ASTHMA SEVERITY, UNSPECIFIED WHETHER COMPLICATED, UNSPECIFIED WHETHER PERSISTENT: ICD-10-CM

## 2023-06-02 DIAGNOSIS — Z92.21 HISTORY OF CHEMOTHERAPY: ICD-10-CM

## 2023-06-02 PROCEDURE — 97535 SELF CARE MNGMENT TRAINING: CPT

## 2023-06-02 PROCEDURE — 97140 MANUAL THERAPY 1/> REGIONS: CPT

## 2023-06-02 NOTE — THERAPY TREATMENT NOTE
Outpatient Occupational Therapy Lymphedema Treatment Note  JANET [place]     Patient Name: Anastacia Loomis  : 1945  MRN: [id number]  Today's Date: 2023      Visit Date: 2023    Patient Active Problem List   Diagnosis   • DM (diabetes mellitus), type 2   • Essential hypertension   • Lymphedema   • Breast cancer   • Mixed hyperlipidemia   • Smoker   • Asthma   • Anemia   • Precordial pain   • Shortness of breath   • Palpitations        Past Medical History:   Diagnosis Date   • Anemia    • Arthritis     shoulder on left and right hip   • Asthma    • Cancer     left breast, chemo. rad. partial mastect.   • Diabetes mellitus    • History of Holter monitoring    • History of ulceration    • Hyperlipidemia    • Hypertension    • Irregular heart rhythm    • Lymphedema    • Menopause         Past Surgical History:   Procedure Laterality Date   • HYSTERECTOMY     • LYMPHADENECTOMY     • MASTECTOMY, PARTIAL      left   • SINUS SURGERY     • TUMOR REMOVAL           Visit Dx:      ICD-10-CM ICD-9-CM   1. Lymphedema  I89.0 457.1   2. History of cancer  Z85.9 V10.90   3. History of radiation therapy  Z92.3 V15.3   4. History of chemotherapy  Z92.21 V87.41   5. History of partial mastectomy of left breast  Z90.12 V45.71   6. Anemia, unspecified type  D64.9 285.9   7. Asthma, unspecified asthma severity, unspecified whether complicated, unspecified whether persistent  J45.909 493.90   8. Mixed hyperlipidemia  E78.2 272.2        Lymphedema     Row Name 23 1000             Subjective Pain    Able to rate subjective pain? yes  -BC      Pre-Treatment Pain Level 0  -BC      Post-Treatment Pain Level 9  -BC      Subjective Pain Comment L shoulder/back  Pt. reports movement causing her extreme pain in her back.  -BC         Subjective Comments    Subjective Comments Pt. continues to have pain in her back with walking and reports her L shoulder has some minimal pain.  -BC         Lymphedema Assessment    Lymphedema  Classification LUE:;Trunk:;secondary;stage 2 (Spontaneously Irreversible)  -BC      Lymphedema Cancer Related Sx left;other (comment);hysterectomy  partial mastectomy, lymph node removal  -BC      Lymph Nodes Removed # 4  -BC      Positive Lymph Nodes # 2  -BC      Chemo Received yes  -BC      Radiation Therapy Received yes  -BC      Infections or Cellulitis? no  -BC      Lymphedema Precautions anemia;asthma  -BC         Posture/Observations    Alignment Options Rounded shoulders  -BC      Rounded Shoulders Mild;Moderate  -BC         Lymphedema Edema Assessment    Ptting Edema Category By severity;By grade out of 4  -BC      Pitting Edema + 4/4;Moderate;Severe  -BC      [place] Sign left:;positive  -BC      [place] Hump left:;positive  decreasing  -BC         Skin Changes/Observations    Location/Assessment Upper Extremity;Upper Quadrant  -BC      Upper Extremity Conditions left:;clean;dry  -BC      Upper Extremity Color/Pigment left:;hyperpigmented;P'eau d'orange  -BC      Upper Quadrant Conditions left:  -BC      Upper Quadrant Color/Pigment hyperpigmented;fibrosis  fibrosis - breast  -BC         Lymphedema Sensation    Lymphedema Sensation Reports LUE:;tingling  -BC      Lymphedema Sensation Tests light touch  -BC      Lymphedema Light Touch WNL  -BC         Lymphedema Pulses/Capillary Refill    Lymphedema Pulses/Capillary Refill capillary refill  -BC      Capillary Refill upper extremity capillary refill  -BC      Upper Extremity Capillary Refill left:;less than 3 seconds  -BC         Lymphedema Measurements    Measurement Type(s) Quick Girth;Circumferential  -BC      Quick Girth Areas Upper extremities  -BC      Circumferential Areas Trunk  -BC         LUE Quick Girth (cm)    Axilla 44 cm  -BC      Mid upper arm 35 cm  -BC      Elbow 30 cm  -BC      Mid forearm 30.5 cm  -BC      Wrist crease 19.6 cm  -BC      Web space 23 cm  -BC      Met-heads 20.5 cm  -BC      Other 1 26 cm  -BC      Other 2 29.5 cm  -BC       LUE Quick Girth Total 258.1  -BC         Trunk Circumferential (cm)    Measurement Location 1 Axilla  -BC      Trunk 1 97 cm  -BC      Measurement Location 2 Below breast  -BC      Trunk 2 102 cm  -BC      Measurement Location 3 Navel  -BC      Trunk 3 112 cm  -BC      Trunk Circumferential Total 311 cm  -BC         Manual Lymphatic Drainage    Manual Lymphatic Drainage extremity treatment;opened regional lymph nodes;initial sequence  -BC      Initial Sequence short neck;shoulder collectors;supraclavicular;abdomen  -BC      Opened Regional Lymph Nodes axillary;inguinal;paraspinal  -BC      Extremity Treatment MLD to full limb  -BC      MLD to Full Limb LUE  -BC      Manual Lymphatic Drainage Comments Vibration as tolerated.  -BC      Manual Therapy MLD to LUE, abd., axillary, paraspinals  -BC         Compression/Skin Care    Compression/Skin Care skin care;wrapping location;bandaging  -BC      Skin Care lotion applied  -BC      Wrapping Location upper extremity  -BC      Wrapping Location UE left:;hand to axilla  -BC      Wrapping Comments Kinesiotape to L scapula, L rotator cuff, DEDE  -BC      Bandage Layers soft foam- 1/4 inch;short-stretch bandages (comment size/quantity);cotton elastic stocking- single layer (comment size)  -BC      Bandaging Technique circumferential/spiral;moderate compression  -BC      Compression/Skin Care Comments Compression pump x Abd., LUE.  -BC            User Key  (r) = Recorded By, (t) = Taken By, (c) = Cosigned By    Initials Name Provider Type    Amanda Eid, OT Occupational Therapist                         OT Assessment/Plan     Row Name 06/02/23 7704          OT Assessment    Assessment Comments Pt. positioned in supported recline for manual lymph drainage, comprssion pump, sink care, K-taping and multi layered bandaging of LUE.  -BC           User Key  (r) = Recorded By, (t) = Taken By, (c) = Cosigned By    Initials Name Provider Type    Amanda Eid, OT  Occupational Therapist                          Therapy Education  Given: HEP  Program: Reinforced  How Provided: Verbal  Provided to: Patient  Level of Understanding: Verbalized                Time Calculation:   OT Start Time: 1000  OT Stop Time: 1130  OT Time Calculation (min): 90 min  OT Non-Billable Time (min): 15 min     Therapy Charges for Today     Code Description Service Date Service Provider Modifiers Qty    09366371096 HC OT SELF CARE/MGMT/TRAIN EA 15 MIN 6/2/2023 Amanda Spencer OT GO, KX 2    99364057602 HC OT MANUAL THERAPY EA 15 MIN 6/2/2023 Amanda Spencer OT GO, KX 4                      Amanda Spencer, OT  6/2/2023

## 2023-06-07 ENCOUNTER — HOSPITAL ENCOUNTER (OUTPATIENT)
Dept: OCCUPATIONAL THERAPY | Facility: HOSPITAL | Age: 78
Setting detail: THERAPIES SERIES
Discharge: HOME | End: 2023-06-07
Payer: MEDICARE

## 2023-06-07 ENCOUNTER — TELEPHONE (OUTPATIENT)
Dept: CARDIOLOGY | Facility: CLINIC | Age: 78
End: 2023-06-07
Payer: MEDICARE

## 2023-06-07 DIAGNOSIS — Z92.21 HISTORY OF CHEMOTHERAPY: ICD-10-CM

## 2023-06-07 DIAGNOSIS — I89.0 LYMPHEDEMA: Primary | ICD-10-CM

## 2023-06-07 DIAGNOSIS — J45.909 ASTHMA, UNSPECIFIED ASTHMA SEVERITY, UNSPECIFIED WHETHER COMPLICATED, UNSPECIFIED WHETHER PERSISTENT: ICD-10-CM

## 2023-06-07 DIAGNOSIS — D64.9 ANEMIA, UNSPECIFIED TYPE: ICD-10-CM

## 2023-06-07 DIAGNOSIS — Z90.12 HISTORY OF PARTIAL MASTECTOMY OF LEFT BREAST: ICD-10-CM

## 2023-06-07 DIAGNOSIS — Z92.3 HISTORY OF RADIATION THERAPY: ICD-10-CM

## 2023-06-07 DIAGNOSIS — Z85.9 HISTORY OF CANCER: ICD-10-CM

## 2023-06-07 PROCEDURE — 97016 VASOPNEUMATIC DEVICE THERAPY: CPT

## 2023-06-07 PROCEDURE — 97139 UNLISTED THERAPEUTIC PX: CPT

## 2023-06-07 PROCEDURE — 97140 MANUAL THERAPY 1/> REGIONS: CPT

## 2023-06-07 NOTE — THERAPY TREATMENT NOTE
Outpatient Occupational Therapy Lymphedema Treatment Note  JANET Goss     Patient Name: Anastacia Loomis  : 1945  MRN: [id number]  Today's Date: 2023      Visit Date: 2023    Patient Active Problem List   Diagnosis    DM (diabetes mellitus), type 2    Essential hypertension    Lymphedema    Breast cancer    Mixed hyperlipidemia    Smoker    Asthma    Anemia    Precordial pain    Shortness of breath    Palpitations        Past Medical History:   Diagnosis Date    Anemia     Arthritis     shoulder on left and right hip    Asthma     Cancer     left breast, chemo. rad. partial mastect.    Diabetes mellitus     History of Holter monitoring     History of ulceration     Hyperlipidemia     Hypertension     Irregular heart rhythm     Lymphedema     Menopause         Past Surgical History:   Procedure Laterality Date    HYSTERECTOMY      LYMPHADENECTOMY      MASTECTOMY, PARTIAL      left    SINUS SURGERY      TUMOR REMOVAL           Visit Dx:      ICD-10-CM ICD-9-CM   1. Lymphedema  I89.0 457.1   2. History of cancer  Z85.9 V10.90   3. History of radiation therapy  Z92.3 V15.3   4. History of chemotherapy  Z92.21 V87.41   5. History of partial mastectomy of left breast  Z90.12 V45.71   6. Anemia, unspecified type  D64.9 285.9   7. Asthma, unspecified asthma severity, unspecified whether complicated, unspecified whether persistent  J45.909 493.90        Lymphedema       Row Name 23 1000             Subjective Pain    Able to rate subjective pain? yes  -AB      Pre-Treatment Pain Level 2  -AB      Subjective Pain Comment back  -AB         Subjective Comments    Subjective Comments Pt. reports no new changes or c/o this date.  -AB         Lymphedema Assessment    Lymphedema Classification LUE:;Trunk:;secondary;stage 2 (Spontaneously Irreversible)  -AB      Lymphedema Cancer Related Sx left;other (comment);hysterectomy  partial mastectomy, lymph node removal  -AB      Lymph Nodes Removed # 4  -AB       Positive Lymph Nodes # 2  -AB      Chemo Received yes  -AB      Radiation Therapy Received yes  -AB      Infections or Cellulitis? no  -AB      Lymphedema Precautions anemia;asthma  -AB         Posture/Observations    Alignment Options Rounded shoulders  -AB      Rounded Shoulders Mild;Moderate  -AB         Lymphedema Edema Assessment    Ptting Edema Category By severity;By grade out of 4  -AB      Pitting Edema + 4/4;Moderate;Severe  -AB      Stemmer Sign left:;positive  -AB      Crockett Hump left:;positive  decreasing  -AB         Skin Changes/Observations    Location/Assessment Upper Extremity;Upper Quadrant  -AB      Upper Extremity Conditions left:;clean;dry  -AB      Upper Extremity Color/Pigment left:;hyperpigmented;P'eau d'orange  -AB      Upper Quadrant Conditions left:  -AB      Upper Quadrant Color/Pigment hyperpigmented;fibrosis  fibrosis - breast  -AB         Lymphedema Sensation    Lymphedema Sensation Reports LUE:;tingling  -AB      Lymphedema Sensation Tests light touch  -AB      Lymphedema Light Touch WNL  -AB         Lymphedema Pulses/Capillary Refill    Lymphedema Pulses/Capillary Refill capillary refill  -AB      Capillary Refill upper extremity capillary refill  -AB      Upper Extremity Capillary Refill left:;less than 3 seconds  -AB         Lymphedema Measurements    Measurement Type(s) Quick Girth;Circumferential  -AB      Quick Girth Areas Upper extremities  -AB      Circumferential Areas Trunk  -AB         LUE Quick Girth (cm)    Axilla 42 cm  -AB      Mid upper arm 35.7 cm  -AB      Elbow 28.6 cm  -AB      Mid forearm 30.7 cm  -AB      Wrist crease 19.2 cm  -AB      Web space 22 cm  -AB      Met-heads 20.4 cm  -AB      Other 1 24.6 cm  -AB      Other 2 28.7 cm  -AB      LUE Quick Girth Total 251.9  -AB         Trunk Circumferential (cm)    Measurement Location 1 Axilla  -AB      Measurement Location 2 Below breast  -AB      Measurement Location 3 Navel  -AB         Manual Lymphatic Drainage     Manual Lymphatic Drainage extremity treatment;opened regional lymph nodes;initial sequence  -AB      Initial Sequence short neck;shoulder collectors;supraclavicular;abdomen  -AB      Opened Regional Lymph Nodes axillary;inguinal;paraspinal  -AB      Extremity Treatment MLD to full limb  -AB      MLD to Full Limb LUE  -AB      Manual Lymphatic Drainage Comments vibration to LUE, axilla, trunk  -AB      Manual Therapy MLD to LUE, abdomen, axilla, inguinal, shoulder collectors, paraspinals  -AB         Compression/Skin Care    Compression/Skin Care skin care;wrapping location;bandaging  -AB      Skin Care lotion applied  -AB      Wrapping Location upper extremity  -AB      Wrapping Location UE left:;hand to axilla  -AB      Bandage Layers soft foam- 1/4 inch;short-stretch bandages (comment size/quantity);cotton elastic stocking- single layer (comment size)  -AB      Bandaging Technique circumferential/spiral;moderate compression  -AB      Compression/Skin Care Comments compression pump to LUE and abdomen  -AB                User Key  (r) = Recorded By, (t) = Taken By, (c) = Cosigned By      Initials Name Provider Type    AB Devika Cantu OT Occupational Therapist                                          Therapy Education  Given: HEP, Edema management, Symptoms/condition management, Bandaging/dressing change  Program: Reinforced  How Provided: Verbal, Demonstration  Provided to: Patient  Level of Understanding: Verbalized                Time Calculation:   OT Start Time: 1000  OT Stop Time: 1125  OT Time Calculation (min): 85 min  OT Non-Billable Time (min): 25 min  Total Timed Code Minutes- OT: 85 minute(s)     Therapy Charges for Today       Code Description Service Date Service Provider Modifiers Qty    07293266094  OT MANUAL THERAPY EA 15 MIN 6/7/2023 Devika Cantu OT GO, KX 4    35465687652 HC OT VASOPNEUMAT DEVIC 1 OR MORE AREAS 6/7/2023 Devika Cantu OT GO, KX 1     [id number]  OT LYMPHEDEMA MANAGEMENT-15 MIN 6/7/2023 Devika Cantu, OT KX 1                        Devika Cantu OT  6/7/2023

## 2023-06-08 ENCOUNTER — HOSPITAL ENCOUNTER (OUTPATIENT)
Dept: OCCUPATIONAL THERAPY | Facility: HOSPITAL | Age: 78
Setting detail: THERAPIES SERIES
Discharge: HOME | End: 2023-06-08
Payer: MEDICARE

## 2023-06-08 DIAGNOSIS — E78.2 MIXED HYPERLIPIDEMIA: ICD-10-CM

## 2023-06-08 DIAGNOSIS — Z90.12 HISTORY OF PARTIAL MASTECTOMY OF LEFT BREAST: ICD-10-CM

## 2023-06-08 DIAGNOSIS — D64.9 ANEMIA, UNSPECIFIED TYPE: ICD-10-CM

## 2023-06-08 DIAGNOSIS — I89.0 LYMPHEDEMA: Primary | ICD-10-CM

## 2023-06-08 DIAGNOSIS — Z92.21 HISTORY OF CHEMOTHERAPY: ICD-10-CM

## 2023-06-08 DIAGNOSIS — Z92.3 HISTORY OF RADIATION THERAPY: ICD-10-CM

## 2023-06-08 DIAGNOSIS — Z85.9 HISTORY OF CANCER: ICD-10-CM

## 2023-06-08 DIAGNOSIS — J45.909 ASTHMA, UNSPECIFIED ASTHMA SEVERITY, UNSPECIFIED WHETHER COMPLICATED, UNSPECIFIED WHETHER PERSISTENT: ICD-10-CM

## 2023-06-08 PROCEDURE — 97535 SELF CARE MNGMENT TRAINING: CPT

## 2023-06-08 PROCEDURE — 97140 MANUAL THERAPY 1/> REGIONS: CPT

## 2023-06-08 NOTE — THERAPY TREATMENT NOTE
Outpatient Occupational Therapy Lymphedema Treatment Note  JANET Goss     Patient Name: Anastacia Loomis  : 1945  MRN: [id number]  Today's Date: 2023      Visit Date: 2023    Patient Active Problem List   Diagnosis    DM (diabetes mellitus), type 2    Essential hypertension    Lymphedema    Breast cancer    Mixed hyperlipidemia    Smoker    Asthma    Anemia    Precordial pain    Shortness of breath    Palpitations        Past Medical History:   Diagnosis Date    Anemia     Arthritis     shoulder on left and right hip    Asthma     Cancer     left breast, chemo. rad. partial mastect.    Diabetes mellitus     History of Holter monitoring     History of ulceration     Hyperlipidemia     Hypertension     Irregular heart rhythm     Lymphedema     Menopause         Past Surgical History:   Procedure Laterality Date    HYSTERECTOMY      LYMPHADENECTOMY      MASTECTOMY, PARTIAL      left    SINUS SURGERY      TUMOR REMOVAL           Visit Dx:      ICD-10-CM ICD-9-CM   1. Lymphedema  I89.0 457.1   2. History of cancer  Z85.9 V10.90   3. History of radiation therapy  Z92.3 V15.3   4. History of chemotherapy  Z92.21 V87.41   5. History of partial mastectomy of left breast  Z90.12 V45.71   6. Anemia, unspecified type  D64.9 285.9   7. Asthma, unspecified asthma severity, unspecified whether complicated, unspecified whether persistent  J45.909 493.90   8. Mixed hyperlipidemia  E78.2 272.2        Lymphedema       Row Name 23 1200             Subjective Pain    Able to rate subjective pain? yes  -BC      Pre-Treatment Pain Level 6  -BC      Subjective Pain Comment L shoulder  -BC         Subjective Comments    Subjective Comments Pt. reports increased pain in her L shoulder. Pt. and family advised to f/u with MD for diagnostic evaluation.  -BC         Lymphedema Assessment    Lymphedema Classification LUE:;Trunk:;secondary;stage 2 (Spontaneously Irreversible)  -BC      Lymphedema Cancer Related Sx left;other  (comment);hysterectomy  partial mastectomy, lymph node removal  -BC      Lymph Nodes Removed # 4  -BC      Positive Lymph Nodes # 2  -BC      Chemo Received yes  -BC      Radiation Therapy Received yes  -BC      Infections or Cellulitis? no  -BC      Lymphedema Precautions anemia;asthma  -BC         Posture/Observations    Alignment Options Rounded shoulders  -BC      Rounded Shoulders Mild;Moderate  -BC         Lymphedema Edema Assessment    Ptting Edema Category By severity;By grade out of 4  -BC      Pitting Edema + 4/4;Moderate;Severe  -BC      Stemmer Sign left:;positive  -BC      Hays Hump left:;positive  decreasing  -BC         Skin Changes/Observations    Location/Assessment Upper Extremity;Upper Quadrant  -BC      Upper Extremity Conditions left:;clean;dry  -BC      Upper Extremity Color/Pigment left:;hyperpigmented;P'eau d'orange  -BC      Upper Quadrant Conditions left:  -BC      Upper Quadrant Color/Pigment hyperpigmented;fibrosis  fibrosis - breast  -BC         Lymphedema Sensation    Lymphedema Sensation Reports LUE:;tingling  -BC      Lymphedema Sensation Tests light touch  -BC      Lymphedema Light Touch WNL  -BC         Lymphedema Pulses/Capillary Refill    Lymphedema Pulses/Capillary Refill capillary refill  -BC      Capillary Refill upper extremity capillary refill  -BC      Upper Extremity Capillary Refill left:;less than 3 seconds  -BC         Lymphedema Measurements    Measurement Type(s) Quick Girth;Circumferential  -BC      Quick Girth Areas Upper extremities  -BC      Circumferential Areas Trunk  -BC         Trunk Circumferential (cm)    Measurement Location 1 Axilla  -BC      Measurement Location 2 Below breast  -BC      Measurement Location 3 Navel  -BC         Manual Lymphatic Drainage    Manual Lymphatic Drainage extremity treatment;opened regional lymph nodes;initial sequence  -BC      Initial Sequence short neck;shoulder collectors;supraclavicular;abdomen  -BC      Opened Regional  Lymph Nodes axillary;inguinal;paraspinal  -BC      Extremity Treatment MLD to full limb  -BC      MLD to Full Limb LUE  -BC      Manual Lymphatic Drainage Comments Vibration as tolerated.  -BC      Manual Therapy MLD to LUE, abd.  -BC         Compression/Skin Care    Compression/Skin Care skin care;wrapping location;bandaging  -BC      Skin Care lotion applied  -BC      Wrapping Location upper extremity  -BC      Wrapping Location UE left:;hand to axilla  -BC      Bandage Layers soft foam- 1/4 inch;short-stretch bandages (comment size/quantity);cotton elastic stocking- single layer (comment size)  -BC      Bandaging Technique circumferential/spiral;moderate compression  -BC      Compression/Skin Care Comments Compression pump x LUE, Abd.  -BC                User Key  (r) = Recorded By, (t) = Taken By, (c) = Cosigned By      Initials Name Provider Type    Amanda Eid OT Occupational Therapist                             OT Assessment/Plan       Row Name 06/08/23 3695          OT Assessment    Assessment Comments Pt. positioned in supported recline for manual lymph drainage, compression pump, skin care, and multi layered bandaging of LUE.  -BC               User Key  (r) = Recorded By, (t) = Taken By, (c) = Cosigned By      Initials Name Provider Type    Amanda Eid OT Occupational Therapist                              Therapy Education  Given: HEP, Edema management, Symptoms/condition management, Bandaging/dressing change  Program: Reinforced  How Provided: Verbal  Provided to: Patient  Level of Understanding: Verbalized                Time Calculation:   OT Start Time: 1130  OT Stop Time: 1300  OT Time Calculation (min): 90 min  OT Non-Billable Time (min): 30 min     Therapy Charges for Today       Code Description Service Date Service Provider Modifiers Qty    22547696835  OT SELF CARE/MGMT/TRAIN EA 15 MIN 6/8/2023 Amanda Spencer OT GO, KX 4    [id number] HC OT MANUAL THERAPY EA 15 MIN  6/8/2023 Johanna, Amanda, BELKYS BLOCK 2                        Amanda Spencer, OT  6/8/2023

## 2023-06-10 LAB
AMBIG ABBREV BMP8 DEFAULT: NORMAL
BUN SERPL-MCNC: 18 MG/DL (ref 8–27)
BUN/CREAT SERPL: 22 (ref 12–28)
CALCIUM SERPL-MCNC: 9.4 MG/DL (ref 8.7–10.3)
CHLORIDE SERPL-SCNC: 105 MMOL/L (ref 96–106)
CO2 SERPL-SCNC: 22 MMOL/L (ref 20–29)
CREAT SERPL-MCNC: 0.83 MG/DL (ref 0.57–1)
EGFRCR SERPLBLD CKD-EPI 2021: 72 ML/MIN/1.73
GLUCOSE SERPL-MCNC: 164 MG/DL (ref 70–99)
MAGNESIUM SERPL-MCNC: 1.9 MG/DL (ref 1.6–2.3)
POTASSIUM SERPL-SCNC: 4.6 MMOL/L (ref 3.5–5.2)
SODIUM SERPL-SCNC: 141 MMOL/L (ref 134–144)

## 2023-06-12 DIAGNOSIS — R06.02 SHORTNESS OF BREATH: ICD-10-CM

## 2023-06-12 DIAGNOSIS — R60.0 BILATERAL LEG EDEMA: ICD-10-CM

## 2023-07-11 ENCOUNTER — APPOINTMENT (OUTPATIENT)
Dept: OCCUPATIONAL THERAPY | Facility: HOSPITAL | Age: 78
End: 2023-07-11
Payer: MEDICARE

## 2023-07-13 ENCOUNTER — APPOINTMENT (OUTPATIENT)
Dept: OCCUPATIONAL THERAPY | Facility: HOSPITAL | Age: 78
End: 2023-07-13
Payer: MEDICARE

## 2023-07-18 ENCOUNTER — APPOINTMENT (OUTPATIENT)
Dept: OCCUPATIONAL THERAPY | Facility: HOSPITAL | Age: 78
End: 2023-07-18
Payer: MEDICARE

## 2023-07-19 ENCOUNTER — APPOINTMENT (OUTPATIENT)
Dept: OCCUPATIONAL THERAPY | Facility: HOSPITAL | Age: 78
End: 2023-07-19
Payer: MEDICARE

## 2023-07-20 ENCOUNTER — APPOINTMENT (OUTPATIENT)
Dept: OCCUPATIONAL THERAPY | Facility: HOSPITAL | Age: 78
End: 2023-07-20
Payer: MEDICARE

## 2023-07-25 ENCOUNTER — APPOINTMENT (OUTPATIENT)
Dept: OCCUPATIONAL THERAPY | Facility: HOSPITAL | Age: 78
End: 2023-07-25
Payer: MEDICARE

## 2023-07-26 ENCOUNTER — APPOINTMENT (OUTPATIENT)
Dept: OCCUPATIONAL THERAPY | Facility: HOSPITAL | Age: 78
End: 2023-07-26
Payer: MEDICARE

## 2023-07-27 ENCOUNTER — APPOINTMENT (OUTPATIENT)
Dept: OCCUPATIONAL THERAPY | Facility: HOSPITAL | Age: 78
End: 2023-07-27
Payer: MEDICARE

## 2023-11-14 ENCOUNTER — OFFICE VISIT (OUTPATIENT)
Dept: CARDIOLOGY | Facility: CLINIC | Age: 78
End: 2023-11-14
Payer: MEDICARE

## 2023-11-14 VITALS
BODY MASS INDEX: 30.75 KG/M2 | HEIGHT: 65 IN | OXYGEN SATURATION: 97 % | HEART RATE: 68 BPM | DIASTOLIC BLOOD PRESSURE: 79 MMHG | SYSTOLIC BLOOD PRESSURE: 148 MMHG | WEIGHT: 184.6 LBS

## 2023-11-14 DIAGNOSIS — E78.2 MIXED HYPERLIPIDEMIA: ICD-10-CM

## 2023-11-14 DIAGNOSIS — R06.02 SHORTNESS OF BREATH: ICD-10-CM

## 2023-11-14 DIAGNOSIS — R07.2 PRECORDIAL PAIN: ICD-10-CM

## 2023-11-14 DIAGNOSIS — I10 ESSENTIAL HYPERTENSION: Primary | ICD-10-CM

## 2023-11-14 DIAGNOSIS — I10 ESSENTIAL HYPERTENSION: ICD-10-CM

## 2023-11-14 DIAGNOSIS — R00.2 PALPITATIONS: ICD-10-CM

## 2023-11-14 DIAGNOSIS — R60.0 BILATERAL LEG EDEMA: ICD-10-CM

## 2023-11-14 DIAGNOSIS — F17.200 SMOKER: ICD-10-CM

## 2023-11-14 PROCEDURE — 93000 ELECTROCARDIOGRAM COMPLETE: CPT | Performed by: INTERNAL MEDICINE

## 2023-11-14 PROCEDURE — 3078F DIAST BP <80 MM HG: CPT | Performed by: INTERNAL MEDICINE

## 2023-11-14 PROCEDURE — 99214 OFFICE O/P EST MOD 30 MIN: CPT | Performed by: INTERNAL MEDICINE

## 2023-11-14 PROCEDURE — 3077F SYST BP >= 140 MM HG: CPT | Performed by: INTERNAL MEDICINE

## 2023-11-14 RX ORDER — FEXOFENADINE HCL 180 MG/1
180 TABLET ORAL DAILY PRN
COMMUNITY
Start: 2023-10-26 | End: 2024-10-25

## 2023-11-14 RX ORDER — CARVEDILOL 25 MG/1
TABLET ORAL
Qty: 180 TABLET | Refills: 3 | Status: SHIPPED | OUTPATIENT
Start: 2023-11-14

## 2023-11-14 RX ORDER — AZELASTINE HYDROCHLORIDE 137 UG/1
SPRAY, METERED NASAL
COMMUNITY
Start: 2023-10-26

## 2023-11-14 RX ORDER — DULAGLUTIDE 1.5 MG/.5ML
INJECTION, SOLUTION SUBCUTANEOUS WEEKLY
COMMUNITY
Start: 2023-05-24

## 2023-11-14 RX ORDER — FUROSEMIDE 20 MG/1
20 TABLET ORAL DAILY PRN
Qty: 90 TABLET | Refills: 3 | Status: SHIPPED | OUTPATIENT
Start: 2023-11-14

## 2023-11-14 NOTE — PROGRESS NOTES
Subjective   Anastacia Loomis is a 78 y.o. female     Chief Complaint   Patient presents with    Follow-up     Here for 6 mo. F/u    Hyperlipidemia    Hypertension    Palpitations       PROBLEM LIST:     0. CAD, severe 3 vessel per CT chest at  12-3-2020  0.1 Dobutamine stress echo, 4-8-2022, no ischemia,   There is very mild inferobasilar and diaphragmatic hypokinesis at baseline with normal augmentation of wall motion and wall thickening in all segments.  There was an increase in global left ventricular ejection fraction and decrease in left ventricular end-systolic dimensions.  1. HTN  2. Hyperlipidemia  3. DM, type 2  4. Hx Breast CA, left s/p chemo./rad. partial mastect. Followed by [facility]  5. Asthma  6. Palpitations  7. Anemia  8. Lymphedema left arm/hand  9. Chronic smoker  10. Echo, 3-, EF 50+-%, mod. LVH, grade 1 DD, minimal mitral leaflet sclerosis, trivial-mild MR, trivial TR, trivial post and post. Lateral pericardial effusion, pulm. Pressures 30 mmHg    Specialty Problems          Cardiology Problems    Essential hypertension        Mixed hyperlipidemia        Palpitations             HPI:  Ms. Loomis returns for follow-up on the above.    From a cardiovascular standpoint she has been stable to somewhat improved since the time of her last visit.  Ms. Loomis describes that she has edema only when she is sedentary for prolonged periods and uses her Lasix only, on average, twice a week.  She continues to be without chest pain, orthopnea, or PND.  She has minimal lower extremity edema which has been stable.  Left arm lymphedema also is unchanged.    The patient describes occasional sensed extrasystoles pattern unchanged from prior.  She has stable class III functional capacity.  At the time of her last visit we discussed increased physical activity that was not accomplished.  However, Ms. Loomis states she has decreased her tobacco use from 6 packs a week to 4 packs a week.  Blood  pressures are generally well controlled at home.  Diabetes also seems to be under good control and medications are appropriate.                          PRIOR MEDICATIONS    Current Outpatient Medications on File Prior to Visit   Medication Sig Dispense Refill    albuterol (PROVENTIL) (2.5 MG/3ML) 0.083% nebulizer solution prn      albuterol sulfate  (90 Base) MCG/ACT inhaler prn      aspirin (aspirin) 81 MG EC tablet Take 1 tablet by mouth Daily. 90 tablet 3    Azelastine HCl 137 MCG/SPRAY solution prn      eplerenone (INSPRA) 25 MG tablet TAKE QOD (Patient taking differently: Daily As Needed.) 30 tablet 11    fexofenadine (ALLEGRA) 180 MG tablet Take 1 tablet by mouth Daily As Needed.      fluticasone (FLONASE) 50 MCG/ACT nasal spray 1 spray into the nostril(s) as directed by provider Daily As Needed. prn      furosemide (LASIX) 20 MG tablet TAKE QOD (Patient taking differently: Take 1 tablet by mouth Daily As Needed.) 30 tablet 11    glyburide (DIAbeta) 5 MG tablet 2 tablets 2 (Two) Times a Day.      guaiFENesin (MUCINEX) 600 MG 12 hr tablet Take 1 tablet by mouth At Night As Needed for Cough.      metFORMIN (GLUCOPHAGE) 1000 MG tablet Take 1 tablet by mouth 2 (Two) Times a Day.      olmesartan (Benicar) 40 MG tablet Take 1 tablet by mouth Daily. 30 tablet 11    rosuvastatin (CRESTOR) 40 MG tablet Every Night.      sertraline (ZOLOFT) 50 MG tablet Take 1 tablet by mouth Daily.      Trulicity 1.5 MG/0.5ML solution pen-injector Inject  under the skin into the appropriate area as directed 1 (One) Time Per Week.      [DISCONTINUED] amLODIPine (NORVASC) 5 MG tablet Take 1 tablet by mouth Daily. 30 tablet 11    [DISCONTINUED] carvedilol (COREG) 25 MG tablet Take 1 tablet by mouth 2 (Two) Times a Day. 60 tablet 11    [DISCONTINUED] Januvia 100 MG tablet Daily.      [DISCONTINUED] sertraline (ZOLOFT) 25 MG tablet Take 3 tablets by mouth Daily.       No current facility-administered medications on file prior to  visit.       ALLERGIES:    Penicillins, Propoxyphene, Sterols (pine), Aldactone [spironolactone], Jardiance [empagliflozin], Meperidine, Prednisone, and Sulfamethoxazole    PAST MEDICAL HISTORY:    Past Medical History:   Diagnosis Date    Anemia     Arthritis     shoulder on left and right hip    Asthma     Cancer     left breast, chemo. rad. partial mastect.    Diabetes mellitus     History of Holter monitoring     History of ulceration     Hyperlipidemia     Hypertension     Irregular heart rhythm     Lymphedema     Menopause        SURGICAL HISTORY:    Past Surgical History:   Procedure Laterality Date    HYSTERECTOMY      LYMPHADENECTOMY      MASTECTOMY, PARTIAL      left    SINUS SURGERY      TUMOR REMOVAL         SOCIAL HISTORY:    Social History     Socioeconomic History    Marital status:    Tobacco Use    Smoking status: Every Day     Packs/day: .5     Types: Cigarettes    Smokeless tobacco: Never    Tobacco comments:     undecided on quiting   Substance and Sexual Activity    Alcohol use: Never    Drug use: Never    Sexual activity: Defer       FAMILY HISTORY:    Family History   Problem Relation Age of Onset    Heart attack Mother     Rheumatic fever Mother     Stroke Father     Cancer Sister     Cancer Brother     Heart attack Brother        Review of Systems   Constitutional: Negative.    HENT: Negative.     Eyes:  Positive for visual disturbance (glasses).   Respiratory:  Positive for cough (since covid.) and shortness of breath (occas., about same as prior and worsens with activity).         Denies orthopnea/PND   Cardiovascular:  Positive for palpitations (occas.) and leg swelling (occas.). Negative for chest pain.   Gastrointestinal: Negative.    Endocrine: Negative.    Genitourinary: Negative.    Musculoskeletal:  Positive for arthralgias, back pain and myalgias.        Denies leg cramps with ambulation   Skin: Negative.    Allergic/Immunologic: Positive for environmental allergies.  "  Neurological: Negative.         Denies stroke like sx's   Hematological:  Bruises/bleeds easily.   Psychiatric/Behavioral: Negative.         VISIT VITALS:  Vitals:    11/14/23 1343   BP: 148/79   BP Location: Left arm   Patient Position: Sitting   Pulse: 68   SpO2: 97%   Weight: 83.7 kg (184 lb 9.6 oz)   Height: 165 cm (64.96\")      /79 (BP Location: Left arm, Patient Position: Sitting)   Pulse 68   Ht 165 cm (64.96\")   Wt 83.7 kg (184 lb 9.6 oz)   SpO2 97%   BMI 30.76 kg/m²     RECENT LABS:    Objective       Physical Exam  Vitals and nursing note reviewed.   Constitutional:       General: She is not in acute distress.     Appearance: She is well-developed.   HENT:      Head: Normocephalic and atraumatic.   Eyes:      Conjunctiva/sclera: Conjunctivae normal.      Pupils: Pupils are equal, round, and reactive to light.   Neck:      Vascular: No carotid bruit, hepatojugular reflux or JVD.      Trachea: No tracheal deviation.      Comments: Nl. Carotid upstrokes  Cardiovascular:      Rate and Rhythm: Normal rate and regular rhythm.      Pulses:           Radial pulses are 2+ on the right side and 2+ on the left side.      Heart sounds: Normal heart sounds, S1 normal and S2 normal. No murmur heard.     No friction rub. S4 sounds present. No S3 sounds.   Pulmonary:      Effort: Pulmonary effort is normal.      Breath sounds: Examination of the left-upper field reveals wheezing. Wheezing (scant wheeze on end expir.) present. No rhonchi or rales.      Comments: Nl. Expir. Phase  Nl. Breath sound intensity    Abdominal:      General: Bowel sounds are normal. There is no distension or abdominal bruit.      Palpations: Abdomen is soft. There is no mass.      Tenderness: There is no abdominal tenderness. There is no guarding or rebound.      Comments: No organomegaly   Musculoskeletal:         General: No tenderness or deformity. Normal range of motion.      Cervical back: Normal range of motion and neck supple. "      Right lower leg: Edema present.      Left lower leg: No edema.      Comments: LLE, no edema, palpable DP pedal pulse  RLE, trace edema, palpable DP pedal pulse  LUE lymphedema  Finger Cap. Refill 2-3 sec's   Skin:     General: Skin is warm and dry.      Coloration: Skin is not pale.      Findings: No erythema or rash.   Neurological:      Mental Status: She is alert and oriented to person, place, and time.   Psychiatric:         Behavior: Behavior normal.         Thought Content: Thought content normal.         Judgment: Judgment normal.           ECG 12 Lead    Date/Time: 11/14/2023 2:24 PM  Performed by: Carmelo Block MD    Authorized by: Carmelo Block MD  Comparison: compared with previous ECG from 3/1/2022  Comments: Sinus at 72.  Borderline first-degree AV block.  Within normal limits and no change.            Assessment & Plan   #1.  Multivessel coronary artery disease by CTA with no evidence of ischemia on dobutamine stress echo.  Ms. Loomis is without angina at low levels of physical activity but her functional capacity is stable and she has no heart failure or significant dysrhythmic symptoms.  We will continue close clinical monitoring and risk factor modification.    2.  Systemic hypertension.  Blood pressures are generally well controlled.  We will continue close monitoring.    3.  Palpitations compatible with sensed extrasystole.  Symptoms have remained stable over time.    4.  Diabetes.  Diabetes appears to be under good control of medications are appropriate from a cardiovascular perspective.    5.  Small pericardial effusion on prior echo.  The patient is clinically stable.  I see no indication for follow-up imaging at this time.    6.  Tobacco habituation.  The patient was applauded on her efforts at smoking reduction.    7.  Ms. Loomis will follow with Qing Candelaria as instructed we will plan on seeing her in follow-up in 1 year or on a as needed basis for symptoms as discussed in  detail today.   Diagnosis Plan   1. Essential hypertension        2. Mixed hyperlipidemia        3. Palpitations        4. Precordial pain        5. Shortness of breath        6. Smoker            No follow-ups on file.         Anastacia Loomis  reports that she has been smoking cigarettes. She has been smoking an average of .5 packs per day. She has never used smokeless tobacco.. I have educated her on the risk of diseases from using tobacco products such as cancer, COPD, and heart disease.     I advised her to quit and she is not willing to quit.    I spent 3  minutes counseling the patient.        Advance Care Planning   ACP discussion was held with the patient during this visit. Patient does not have an advance directive, declines further assistance.                    Electronically signed by:    Scribed for Carmelo Block MD by Lorena Garay LPN on November 14, 2023  at 14:24 EST    I, Carmelo Block MD personally performed the services described in this documentation as scribed by the above named individual in my presence, and it is both accurate and complete. November 14, 2023 14:24 EST      Dictated Utilizing Dragon Dictation: Part of this note may be an electronic transcription/translation of spoken language to printed text using the Dragon Dictation System.

## 2023-11-27 DIAGNOSIS — I10 ESSENTIAL HYPERTENSION: ICD-10-CM

## 2023-11-27 RX ORDER — OLMESARTAN MEDOXOMIL 40 MG/1
40 TABLET ORAL DAILY
Qty: 30 TABLET | Refills: 11 | Status: SHIPPED | OUTPATIENT
Start: 2023-11-27

## 2024-01-02 DIAGNOSIS — I10 ESSENTIAL HYPERTENSION: ICD-10-CM

## 2024-01-02 RX ORDER — OLMESARTAN MEDOXOMIL 40 MG/1
40 TABLET ORAL DAILY
Qty: 30 TABLET | Refills: 5 | Status: SHIPPED | OUTPATIENT
Start: 2024-01-02

## 2024-10-15 ENCOUNTER — CONSULT (OUTPATIENT)
Dept: RADIATION ONCOLOGY | Facility: HOSPITAL | Age: 79
End: 2024-10-15
Payer: MEDICARE

## 2024-10-15 ENCOUNTER — HOSPITAL ENCOUNTER (OUTPATIENT)
Dept: RADIATION ONCOLOGY | Facility: HOSPITAL | Age: 79
Setting detail: RADIATION/ONCOLOGY SERIES
End: 2024-10-15
Payer: MEDICARE

## 2024-10-15 VITALS
TEMPERATURE: 98.1 F | HEART RATE: 75 BPM | OXYGEN SATURATION: 97 % | RESPIRATION RATE: 18 BRPM | DIASTOLIC BLOOD PRESSURE: 59 MMHG | SYSTOLIC BLOOD PRESSURE: 121 MMHG

## 2024-10-15 DIAGNOSIS — Z17.0 MALIGNANT NEOPLASM OF UPPER-OUTER QUADRANT OF LEFT BREAST IN FEMALE, ESTROGEN RECEPTOR POSITIVE: Primary | ICD-10-CM

## 2024-10-15 DIAGNOSIS — C50.412 MALIGNANT NEOPLASM OF UPPER-OUTER QUADRANT OF LEFT BREAST IN FEMALE, ESTROGEN RECEPTOR POSITIVE: Primary | ICD-10-CM

## 2024-10-15 RX ORDER — LETROZOLE 2.5 MG/1
2.5 TABLET, FILM COATED ORAL
COMMUNITY
Start: 2024-09-11 | End: 2025-09-11

## 2024-10-15 NOTE — PROGRESS NOTES
New Patient Visit       Patient: Anastacia Loomis   YOB: 1945   Medical Record Number: [id number]   Date of Visit  October 15, 2024   Primary Diagnosis:     C50.912, cancer left breast, stage IIIa, T2 N2 M0, infiltrating ductal carcinoma, ER positive VA positive HER2 negative.      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 Anastacia Loomis is referred to our department for consideration of postoperative radiation therapy to her left hip status post left Pascual arthroplasty due to metastatic disease.    Ms. Loomis has a history of breast cancer dates back to 2009.  At that time she was diagnosed as having stage IIIa infiltrating ductal carcinoma of the left breast that was ER positive VA positive and HER2 negative.  She was treated with a lumpectomy and sentinel lymph node dissection neoadjuvant chemotherapy and breast conserving radiation therapy.  She is taking anastrozole since that time.    In May of this year the patient developed pain in her left hip.  She felt a pop.  The pain radiated down her leg.  On 6/18/2024 she had an x-ray of the hip which revealed a lucent sclerotic lesion in the left femoral neck.  A bone scan of 7/9/2024 revealed increased activity at T12 and L4 in the left femoral neck.  CT scan of the chest abdomen pelvis on 7/13/2024 revealed mixed sclerotic lesions especially in the left femoral neck.  This was felt to represent a risk of fracture.  The patient was referred to the [facility].  A repeat x-ray of the hip confirmed a large lytic lesion occupying 70% of the diameter of the left femoral neck was identified.  On 7/24/2024 she underwent a left hemiarthroplasty.  Pathology revealed metastatic carcinoma that was ER positive VA positive HER2 negative.    Today Ms. Loomis states she is recovering well from her surgery.  She is using a walker as she continues to heal.  She is going to rehab as well.  She does  however continue to have pain in her low back.  The pain in the low back radiates down her left leg.      The patient has a an MRI of the spine as well as a PET scan scheduled next week.    The patient otherwise feels well.  Her appetite is healthy and her weight is stable.  She has noticed no adenopathy within the neck supraclavicular fossa or axilla.  She has had no shortness of breath dyspnea upon exertion abdominal discomfort jaundice or new neurologic symptoms.    Review of Systems    All other systems reviewed and are negative.    Vitals:    10/15/24 0943   BP: 121/59   Pulse: 75   Resp: 18   Temp: 98.1 °F (36.7 °C)   SpO2: 97%     Past Medical History:   Diagnosis Date    Anemia     Arthritis     shoulder on left and right hip    Asthma     Cancer     left breast, chemo. rad. partial mastect.    Diabetes mellitus     History of Holter monitoring     History of ulceration     Hyperlipidemia     Hypertension     Irregular heart rhythm     Lymphedema     Menopause         Past Surgical History:   Procedure Laterality Date    HYSTERECTOMY      LYMPHADENECTOMY      MASTECTOMY, PARTIAL      left    SINUS SURGERY      TUMOR REMOVAL        Family History   Problem Relation Age of Onset    Heart attack Mother     Rheumatic fever Mother     Stroke Father     Cancer Sister     Cancer Brother     Heart attack Brother         Social History     Socioeconomic History    Marital status:    Tobacco Use    Smoking status: Every Day     Current packs/day: 0.50     Types: Cigarettes    Smokeless tobacco: Never    Tobacco comments:     undecided on quiting   Substance and Sexual Activity    Alcohol use: Never    Drug use: Never    Sexual activity: Defer          Allergies:  Penicillins, Propoxyphene, Sterols (pine), Aldactone [spironolactone], Jardiance [empagliflozin], Meperidine, Prednisone, and Sulfamethoxazole   Prior to Admission medications    Medication Sig Start Date End Date Taking? Authorizing Provider    albuterol (PROVENTIL) (2.5 MG/3ML) 0.083% nebulizer solution prn 11/7/22  Yes Thanh Ruggiero MD   albuterol sulfate  (90 Base) MCG/ACT inhaler prn 11/1/22  Yes Thanh Ruggiero MD   Azelastine HCl 137 MCG/SPRAY solution prn 10/26/23  Yes Thanh Ruggiero MD   carvedilol (COREG) 25 MG tablet TAKE ONE TABLET BY MOUTH TWICE A DAY (STOP METOPROLOL) 11/14/23  Yes Carmelo Block MD   eplerenone (INSPRA) 25 MG tablet TAKE QOD  Patient taking differently: Daily As Needed. 5/18/23  Yes Carmelo Block MD   fexofenadine (ALLEGRA) 180 MG tablet Take 1 tablet by mouth Daily As Needed. 10/26/23 10/25/24 Yes Thanh Ruggiero MD   furosemide (LASIX) 20 MG tablet Take 1 tablet by mouth Daily As Needed (edema). 11/14/23  Yes Carmelo Block MD   glyburide (DIAbeta) 5 MG tablet 2 tablets 2 (Two) Times a Day. 2/22/21  Yes Thanh Ruggiero MD   guaiFENesin (MUCINEX) 600 MG 12 hr tablet Take 1 tablet by mouth At Night As Needed for Cough.   Yes Thanh Ruggiero MD   letrozole (FEMARA) 2.5 MG tablet Take 1 tablet by mouth. 9/11/24 9/11/25 Yes Thanh Ruggiero MD   metFORMIN (GLUCOPHAGE) 1000 MG tablet Take 1 tablet by mouth 2 (Two) Times a Day. 11/2/21  Yes Thanh Ruggiero MD   olmesartan (BENICAR) 40 MG tablet TAKE ONE TABLET BY MOUTH DAILY 1/2/24  Yes Thais Erazo APRN   rosuvastatin (CRESTOR) 40 MG tablet Every Night. 10/20/21  Yes Thanh Ruggiero MD   sertraline (ZOLOFT) 50 MG tablet Take 1 tablet by mouth Daily. 6/26/23  Yes Thanh Ruggiero MD Trulicity 1.5 MG/0.5ML solution pen-injector Inject  under the skin into the appropriate area as directed 1 (One) Time Per Week. 5/24/23  Yes Thanh Ruggiero MD   aspirin (aspirin) 81 MG EC tablet Take 1 tablet by mouth Daily.  Patient not taking: Reported on 10/15/2024 3/1/22   Carmelo Block MD   fluticasone (FLONASE) 50 MCG/ACT nasal spray 1 spray into the nostril(s) as directed by provider Daily As  Needed. prn  Patient not taking: Reported on 10/15/2024    Provider, MD Thanh      Pain:(on a scale of 0-10)  4  Pain Score    10/15/24 0943   PainSc: 0-No pain        Anastacia Loomis reports a pain score of 4  Given her pain assessment as noted, treatment options were discussed and the following options were decided upon as a follow-up plan to address the patient's pain:  Palliative radiation therapy .       Quality of Life:   ECOG: (2) Ambulatory and capable of self care, unable to carry out work activity, up and about > 50% or waking hours           Physical Examination:  Vitals:  VITALS@    Height:    Weight: Weight: (not recorded)   There is no height or weight on file to calculate BMI.    Physical Exam  Constitutional: The patient is a well-developed, well-nourished 79-year-old female in no acute distress.  Alert and oriented ×3.  She is seen today sitting in her wheelchair.  HEENT: Atraumatic. Normocephalic. No abnormalities noted.  No icterus.  EOMI.  PERRLA.  Lymphatics: No cervical, supraclavicular, or axillary, or inguinal lymphadenopathy is palpated.  CV: Regular rate and rhythm.  No murmurs, rubs, or gallops are appreciated.  Respiratory: Lungs clear to auscultation.  Breath sounds equal bilaterally.  Breasts: The right breast is extremely distorted from previous surgery and radiation.  No palpable lesions are noted within the breast.  The left breast is within normal limits, with no suspicious lesions or nodules palpated.  GI: Abdomen soft, nontender, nondistended, with no hepatosplenomegaly or masses palpated.  Extremities: No clubbing, cyanosis, or edema.  The patient has no percussion all tenderness at T12.  She does have percussion of tenderness at L4.  Her postoperative wound is healing well.  Neurologic: Cranial nerves II through XII are grossly intact, with no focal neurological deficits noted on exam.  Psychiatric: Alert and oriented x3. Normal affect, with no anxiety or depression  noted.    Radiographs : MRI Cyberknife Lumbar Spine Without Contrast    Result Date: 9/19/2024  Aborted MRI study. Please consider rescheduling the MRI scan assisted with anxiolytics/sedation if indicated. CRITICAL RESULT:   No.   COMMUNICATION: Per this written report. By electronically signing this report, I, the attending physician, attest that I have personally reviewed the images/data for the above examination(s) and agree with the final edited report. Drafted by VIKI Fink on 9/19/2024 12:58 PM Final report signed by Murray Teixeira MD on 9/19/2024 5:08 PM    XR hip 2+ vw unilateral    Result Date: 9/16/2024  No evidence of hardware complication of the left hip arthroplasty. Degenerative changes as above. CRITICAL RESULT:   No. COMMUNICATION: Per this written report. Drafted by Vanesa Brennan MD on 9/16/2024 10:49 AM Final report signed by Vanesa Brennan MD on 9/16/2024 10:55 AM    XR hip 2+ vw unilateral    Result Date: 7/24/2024  Expected recent post surgical changes from left hip hemiarthroplasty. CRITICAL RESULT:   No. COMMUNICATION: Per this written report. Drafted by Mark Morris MD on 7/24/2024 12:15 PM Final report signed by Mark Morris MD on 7/24/2024 12:17 PM    CT Abdomen Pelvis With Contrast    Result Date: 7/14/2024  1. Scattered bony metastases, including a lytic lesion in the left femoral neck. 2. No other sites of metastatic disease identified. CRITICAL RESULT: No. COMMUNICATION: Per this written report. Drafted by Ezekiel Almanza MD on 7/14/2024 7:07 PM Final report signed by Ezekiel Almanza MD on 7/14/2024 7:16 PM    CT Chest With Contrast Diagnostic    Result Date: 7/14/2024  1. Scattered bony metastases, including a lytic lesion in the left femoral neck. 2. No other sites of metastatic disease identified. CRITICAL RESULT: No. COMMUNICATION: Per this written report. Drafted by Ezekiel Almanza MD on 7/14/2024 7:07 PM Final report signed by Ezekiel COLLINS  MD Archie on 7/14/2024 7:16 PM    XR hip 2+ vw unilateral    Result Date: 7/13/2024  Large lytic focus in the left femoral neck involving greater than 50% the diameter of the femoral neck. This lesion is at high-risk pathologic fracture. Degenerative changes as above. CRITICAL RESULT:   No. COMMUNICATION: Per this written report. Drafted by Rasheed Bedoya on 7/13/2024 3:44 PM Final report signed by Rasheed Bedoya on 7/13/2024 3:49 PM    NM Bone Scan w Spect/CT Single Area    Result Date: 7/9/2024  Multiple bone metastases. Of note, there are foci involving the left femoral head/neck around a central lytic focus in the femoral neck. This places patient at risk of pathological fracture. CRITICAL RESULT: No. COMMUNICATION: Findings were discussed with MORALES Castellanos on 7/9/2024 3:35 PM by Bowen Baca MD via [facility] secure chat. By electronically signing this report, I, the attending physician, attest that I have personally reviewed the images/data for the above examination(s) and agree with the final edited report. Drafted by Bowen Baca MD on 7/9/2024 3:22 PM Final report signed by Shon Bearden MD on 7/9/2024 4:27 PM    NM Bone Scan Whole Body    Result Date: 7/9/2024  Multiple bone metastases. Of note, there are foci involving the left femoral head/neck around a central lytic focus in the femoral neck. This places patient at risk of pathological fracture. CRITICAL RESULT: No. COMMUNICATION: Findings were discussed with MORALES Castellanos on 7/9/2024 3:35 PM by Bowen Baca MD via [employer] chat. By electronically signing this report, I, the attending physician, attest that I have personally reviewed the images/data for the above examination(s) and agree with the final edited report. Drafted by Bowen Baca MD on 7/9/2024 3:22 PM Final report signed by Shon Bearden MD on 7/9/2024 4:27 PM    XR Hip With or Without Pelvis 2 - 3 View Right    Result Date: 6/19/2024  Degenerative findings with no acute osseous  "abnormality. Possible osseous lesion as above. Recommend 6 month follow-up radiographs to document stability. Images reviewed, interpreted, and dictated by Dr. Marcos Stauffer. Transcribed by Ninfa Matthews PA-C.    Pathology:   Lab Results   Component Value Date    CASEREPORT  07/24/2024     Surgical Pathology                                Case: [id number]                                   Authorizing Provider:  De Hamilton MD   Collected:           07/24/2024 1027              Ordering Location:     [facility] Operating Room       Received:            07/24/2024 1234              Pathologist:           Rena Cruz MD                                                               Specimens:   A) - Hip, Left, left hip fresh for perm                                                             B) - Hip, Left, left femoral head                                                         Labs:   Lab Results   Component Value Date    GLUCOSE 164 (H) 06/09/2023    BUN 18 06/09/2023    CREATININE 0.83 06/09/2023    EGFRIFAFRI 57 07/13/2024    BCR 22 06/09/2023    K 4.6 06/09/2023    CO2 22 06/09/2023    CALCIUM 9.4 06/09/2023       WBC   Date Value Ref Range Status   09/11/2024 5.99 3.70 - 10.30 10*3/uL Final     Hemoglobin   Date Value Ref Range Status   09/11/2024 11.6 11.2 - 15.7 g/dL Final     Hematocrit   Date Value Ref Range Status   09/11/2024 37.8 34.0 - 45.0 % Final     Platelets   Date Value Ref Range Status   09/11/2024 281 155 - 369 10*3/uL Final    No results found for: \"PSA\" No results found for: \"CEA\"       Total time spent: 45 minutes were spent in patient care reviewing previous records (CT scan PET scans operative reports pathology) prior to the patient's visit examining the patient and entering orders and documentation.      ASSESSMENT/PLAN: C79.51 Skeletal metastases from primary carcinoma the breast.  The patient is status post left hemiarthroplasty.  She has bone scan evidence of uptake at T12 and L4. "  A PET scan and a MRI has been ordered for further evaluation of these lesions.    I believe the patient would be an excellent candidate for postoperative radiation therapy administered to the left hip.  I would plan on treating this area to 3000 cGy in 10 equal fractions.    Pending the results of the MRI/PET scan the patient may or may not also be a candidate for treatment to these lesions.        Sincerely,        This document has been electronically signed by César Uriostegui MD   October 15, 2024 12:17 EDT    Dictated Utilizing Dragon Dictation: Part of this note may be an electronic transcription/translation of spoken language to printed text using the Dragon Dictation System.

## 2024-10-21 ENCOUNTER — PATIENT ROUNDING (BHMG ONLY) (OUTPATIENT)
Dept: RADIATION ONCOLOGY | Facility: HOSPITAL | Age: 79
End: 2024-10-21
Payer: MEDICARE

## 2024-10-21 NOTE — PROGRESS NOTES
[facility] PT ROUNDING COMPLETED VIA [employer].

## 2024-10-23 ENCOUNTER — HOSPITAL ENCOUNTER (OUTPATIENT)
Dept: RADIATION ONCOLOGY | Facility: HOSPITAL | Age: 79
Discharge: HOME | End: 2024-10-23

## 2024-10-23 PROCEDURE — 77332 RADIATION TREATMENT AID(S): CPT | Performed by: RADIOLOGY

## 2024-10-23 PROCEDURE — 77334 RADIATION TREATMENT AID(S): CPT | Performed by: RADIOLOGY

## 2024-10-23 PROCEDURE — 77290 THER RAD SIMULAJ FIELD CPLX: CPT | Performed by: RADIOLOGY

## 2024-10-23 PROCEDURE — 77263 THER RADIOLOGY TX PLNG CPLX: CPT | Performed by: RADIOLOGY

## 2024-10-28 PROCEDURE — 77334 RADIATION TREATMENT AID(S): CPT | Performed by: RADIOLOGY

## 2024-10-28 PROCEDURE — 77300 RADIATION THERAPY DOSE PLAN: CPT | Performed by: RADIOLOGY

## 2024-10-28 PROCEDURE — 77295 3-D RADIOTHERAPY PLAN: CPT | Performed by: RADIOLOGY

## 2024-10-30 ENCOUNTER — HOSPITAL ENCOUNTER (OUTPATIENT)
Dept: RADIATION ONCOLOGY | Facility: HOSPITAL | Age: 79
Setting detail: RADIATION/ONCOLOGY SERIES
Discharge: HOME | End: 2024-10-30
Payer: MEDICARE

## 2024-10-30 LAB
RAD ONC ARIA COURSE ID: NORMAL
RAD ONC ARIA COURSE LAST TREATMENT DATE: NORMAL
RAD ONC ARIA COURSE START DATE: NORMAL
RAD ONC ARIA COURSE TREATMENT ELAPSED DAYS: 0
RAD ONC ARIA FIRST TREATMENT DATE: NORMAL
RAD ONC ARIA PLAN FRACTIONS TREATED TO DATE: 1
RAD ONC ARIA PLAN ID: NORMAL
RAD ONC ARIA PLAN PRESCRIBED DOSE PER FRACTION: 3 GY
RAD ONC ARIA PLAN PRIMARY REFERENCE POINT: NORMAL
RAD ONC ARIA PLAN TOTAL FRACTIONS PRESCRIBED: 10
RAD ONC ARIA PLAN TOTAL PRESCRIBED DOSE: 3000 CGY
RAD ONC ARIA REFERENCE POINT DOSAGE GIVEN TO DATE: 3 GY
RAD ONC ARIA REFERENCE POINT ID: NORMAL
RAD ONC ARIA REFERENCE POINT SESSION DOSAGE GIVEN: 3 GY

## 2024-10-30 PROCEDURE — 77280 THER RAD SIMULAJ FIELD SMPL: CPT | Performed by: RADIOLOGY

## 2024-10-30 PROCEDURE — 77412 RADIATION TX DELIVERY LVL 3: CPT | Performed by: RADIOLOGY

## 2024-10-30 PROCEDURE — 77336 RADIATION PHYSICS CONSULT: CPT | Performed by: RADIOLOGY

## 2024-10-31 ENCOUNTER — HOSPITAL ENCOUNTER (OUTPATIENT)
Dept: RADIATION ONCOLOGY | Facility: HOSPITAL | Age: 79
Setting detail: RADIATION/ONCOLOGY SERIES
Discharge: HOME | End: 2024-10-31
Payer: MEDICARE

## 2024-10-31 DIAGNOSIS — C50.412 MALIGNANT NEOPLASM OF UPPER-OUTER QUADRANT OF LEFT BREAST IN FEMALE, ESTROGEN RECEPTOR POSITIVE: Primary | ICD-10-CM

## 2024-10-31 DIAGNOSIS — Z17.0 MALIGNANT NEOPLASM OF UPPER-OUTER QUADRANT OF LEFT BREAST IN FEMALE, ESTROGEN RECEPTOR POSITIVE: Primary | ICD-10-CM

## 2024-10-31 LAB
RAD ONC ARIA COURSE ID: NORMAL
RAD ONC ARIA COURSE LAST TREATMENT DATE: NORMAL
RAD ONC ARIA COURSE START DATE: NORMAL
RAD ONC ARIA COURSE TREATMENT ELAPSED DAYS: 1
RAD ONC ARIA FIRST TREATMENT DATE: NORMAL
RAD ONC ARIA PLAN FRACTIONS TREATED TO DATE: 1
RAD ONC ARIA PLAN FRACTIONS TREATED TO DATE: 2
RAD ONC ARIA PLAN ID: NORMAL
RAD ONC ARIA PLAN ID: NORMAL
RAD ONC ARIA PLAN PRESCRIBED DOSE PER FRACTION: 3 GY
RAD ONC ARIA PLAN PRESCRIBED DOSE PER FRACTION: 4 GY
RAD ONC ARIA PLAN PRIMARY REFERENCE POINT: NORMAL
RAD ONC ARIA PLAN PRIMARY REFERENCE POINT: NORMAL
RAD ONC ARIA PLAN TOTAL FRACTIONS PRESCRIBED: 10
RAD ONC ARIA PLAN TOTAL FRACTIONS PRESCRIBED: 5
RAD ONC ARIA PLAN TOTAL PRESCRIBED DOSE: 2000 CGY
RAD ONC ARIA PLAN TOTAL PRESCRIBED DOSE: 3000 CGY
RAD ONC ARIA REFERENCE POINT DOSAGE GIVEN TO DATE: 4 GY
RAD ONC ARIA REFERENCE POINT DOSAGE GIVEN TO DATE: 6 GY
RAD ONC ARIA REFERENCE POINT ID: NORMAL
RAD ONC ARIA REFERENCE POINT ID: NORMAL
RAD ONC ARIA REFERENCE POINT SESSION DOSAGE GIVEN: 3 GY
RAD ONC ARIA REFERENCE POINT SESSION DOSAGE GIVEN: 4 GY

## 2024-10-31 PROCEDURE — 77300 RADIATION THERAPY DOSE PLAN: CPT | Performed by: RADIOLOGY

## 2024-10-31 PROCEDURE — 77334 RADIATION TREATMENT AID(S): CPT | Performed by: RADIOLOGY

## 2024-10-31 PROCEDURE — 77280 THER RAD SIMULAJ FIELD SMPL: CPT | Performed by: RADIOLOGY

## 2024-10-31 PROCEDURE — 77412 RADIATION TX DELIVERY LVL 3: CPT | Performed by: RADIOLOGY

## 2024-10-31 RX ORDER — PROCHLORPERAZINE MALEATE 10 MG
10 TABLET ORAL EVERY 6 HOURS PRN
Qty: 60 TABLET | Refills: 0 | Status: SHIPPED | OUTPATIENT
Start: 2024-10-31

## 2024-11-01 ENCOUNTER — HOSPITAL ENCOUNTER (OUTPATIENT)
Dept: RADIATION ONCOLOGY | Facility: HOSPITAL | Age: 79
Setting detail: RADIATION/ONCOLOGY SERIES
End: 2024-11-01
Payer: MEDICARE

## 2024-11-01 ENCOUNTER — HOSPITAL ENCOUNTER (OUTPATIENT)
Dept: RADIATION ONCOLOGY | Facility: HOSPITAL | Age: 79
Setting detail: RADIATION/ONCOLOGY SERIES
Discharge: HOME | End: 2024-11-01
Payer: MEDICARE

## 2024-11-01 LAB
RAD ONC ARIA COURSE ID: NORMAL
RAD ONC ARIA COURSE LAST TREATMENT DATE: NORMAL
RAD ONC ARIA COURSE START DATE: NORMAL
RAD ONC ARIA COURSE TREATMENT ELAPSED DAYS: 2
RAD ONC ARIA FIRST TREATMENT DATE: NORMAL
RAD ONC ARIA PLAN FRACTIONS TREATED TO DATE: 2
RAD ONC ARIA PLAN FRACTIONS TREATED TO DATE: 3
RAD ONC ARIA PLAN ID: NORMAL
RAD ONC ARIA PLAN ID: NORMAL
RAD ONC ARIA PLAN PRESCRIBED DOSE PER FRACTION: 3 GY
RAD ONC ARIA PLAN PRESCRIBED DOSE PER FRACTION: 4 GY
RAD ONC ARIA PLAN PRIMARY REFERENCE POINT: NORMAL
RAD ONC ARIA PLAN PRIMARY REFERENCE POINT: NORMAL
RAD ONC ARIA PLAN TOTAL FRACTIONS PRESCRIBED: 10
RAD ONC ARIA PLAN TOTAL FRACTIONS PRESCRIBED: 5
RAD ONC ARIA PLAN TOTAL PRESCRIBED DOSE: 2000 CGY
RAD ONC ARIA PLAN TOTAL PRESCRIBED DOSE: 3000 CGY
RAD ONC ARIA REFERENCE POINT DOSAGE GIVEN TO DATE: 8 GY
RAD ONC ARIA REFERENCE POINT DOSAGE GIVEN TO DATE: 9 GY
RAD ONC ARIA REFERENCE POINT ID: NORMAL
RAD ONC ARIA REFERENCE POINT ID: NORMAL
RAD ONC ARIA REFERENCE POINT SESSION DOSAGE GIVEN: 3 GY
RAD ONC ARIA REFERENCE POINT SESSION DOSAGE GIVEN: 4 GY

## 2024-11-01 PROCEDURE — 77412 RADIATION TX DELIVERY LVL 3: CPT | Performed by: RADIOLOGY

## 2024-11-04 ENCOUNTER — HOSPITAL ENCOUNTER (OUTPATIENT)
Dept: RADIATION ONCOLOGY | Facility: HOSPITAL | Age: 79
Setting detail: RADIATION/ONCOLOGY SERIES
Discharge: HOME | End: 2024-11-04
Payer: MEDICARE

## 2024-11-04 LAB
RAD ONC ARIA COURSE ID: NORMAL
RAD ONC ARIA COURSE LAST TREATMENT DATE: NORMAL
RAD ONC ARIA COURSE START DATE: NORMAL
RAD ONC ARIA COURSE TREATMENT ELAPSED DAYS: 5
RAD ONC ARIA FIRST TREATMENT DATE: NORMAL
RAD ONC ARIA PLAN FRACTIONS TREATED TO DATE: 3
RAD ONC ARIA PLAN FRACTIONS TREATED TO DATE: 4
RAD ONC ARIA PLAN ID: NORMAL
RAD ONC ARIA PLAN ID: NORMAL
RAD ONC ARIA PLAN PRESCRIBED DOSE PER FRACTION: 3 GY
RAD ONC ARIA PLAN PRESCRIBED DOSE PER FRACTION: 4 GY
RAD ONC ARIA PLAN PRIMARY REFERENCE POINT: NORMAL
RAD ONC ARIA PLAN PRIMARY REFERENCE POINT: NORMAL
RAD ONC ARIA PLAN TOTAL FRACTIONS PRESCRIBED: 10
RAD ONC ARIA PLAN TOTAL FRACTIONS PRESCRIBED: 5
RAD ONC ARIA PLAN TOTAL PRESCRIBED DOSE: 2000 CGY
RAD ONC ARIA PLAN TOTAL PRESCRIBED DOSE: 3000 CGY
RAD ONC ARIA REFERENCE POINT DOSAGE GIVEN TO DATE: 12 GY
RAD ONC ARIA REFERENCE POINT DOSAGE GIVEN TO DATE: 12 GY
RAD ONC ARIA REFERENCE POINT ID: NORMAL
RAD ONC ARIA REFERENCE POINT ID: NORMAL
RAD ONC ARIA REFERENCE POINT SESSION DOSAGE GIVEN: 3 GY
RAD ONC ARIA REFERENCE POINT SESSION DOSAGE GIVEN: 4 GY

## 2024-11-05 ENCOUNTER — HOSPITAL ENCOUNTER (OUTPATIENT)
Dept: RADIATION ONCOLOGY | Facility: HOSPITAL | Age: 79
Setting detail: RADIATION/ONCOLOGY SERIES
Discharge: HOME | End: 2024-11-05
Payer: MEDICARE

## 2024-11-05 VITALS
HEART RATE: 76 BPM | RESPIRATION RATE: 18 BRPM | DIASTOLIC BLOOD PRESSURE: 75 MMHG | SYSTOLIC BLOOD PRESSURE: 168 MMHG | TEMPERATURE: 97.9 F | OXYGEN SATURATION: 98 %

## 2024-11-05 DIAGNOSIS — C50.412 MALIGNANT NEOPLASM OF UPPER-OUTER QUADRANT OF LEFT BREAST IN FEMALE, ESTROGEN RECEPTOR POSITIVE: Primary | ICD-10-CM

## 2024-11-05 DIAGNOSIS — Z17.0 MALIGNANT NEOPLASM OF UPPER-OUTER QUADRANT OF LEFT BREAST IN FEMALE, ESTROGEN RECEPTOR POSITIVE: Primary | ICD-10-CM

## 2024-11-05 LAB
RAD ONC ARIA COURSE ID: NORMAL
RAD ONC ARIA COURSE LAST TREATMENT DATE: NORMAL
RAD ONC ARIA COURSE START DATE: NORMAL
RAD ONC ARIA COURSE TREATMENT ELAPSED DAYS: 6
RAD ONC ARIA FIRST TREATMENT DATE: NORMAL
RAD ONC ARIA PLAN FRACTIONS TREATED TO DATE: 4
RAD ONC ARIA PLAN FRACTIONS TREATED TO DATE: 5
RAD ONC ARIA PLAN ID: NORMAL
RAD ONC ARIA PLAN ID: NORMAL
RAD ONC ARIA PLAN PRESCRIBED DOSE PER FRACTION: 3 GY
RAD ONC ARIA PLAN PRESCRIBED DOSE PER FRACTION: 4 GY
RAD ONC ARIA PLAN PRIMARY REFERENCE POINT: NORMAL
RAD ONC ARIA PLAN PRIMARY REFERENCE POINT: NORMAL
RAD ONC ARIA PLAN TOTAL FRACTIONS PRESCRIBED: 10
RAD ONC ARIA PLAN TOTAL FRACTIONS PRESCRIBED: 5
RAD ONC ARIA PLAN TOTAL PRESCRIBED DOSE: 2000 CGY
RAD ONC ARIA PLAN TOTAL PRESCRIBED DOSE: 3000 CGY
RAD ONC ARIA REFERENCE POINT DOSAGE GIVEN TO DATE: 15 GY
RAD ONC ARIA REFERENCE POINT DOSAGE GIVEN TO DATE: 16 GY
RAD ONC ARIA REFERENCE POINT ID: NORMAL
RAD ONC ARIA REFERENCE POINT ID: NORMAL
RAD ONC ARIA REFERENCE POINT SESSION DOSAGE GIVEN: 3 GY
RAD ONC ARIA REFERENCE POINT SESSION DOSAGE GIVEN: 4 GY

## 2024-11-05 NOTE — PROGRESS NOTES
On Treatment Visit Note      Patient Name:     Anastacia Loomis  :                    1945   MRN:                    [id number]   Date of Service:   2024    Diagnosis:     Stage IV (T2 N2 M1) ductal carcinoma of the right breast with osseous metastases.  Patient is receiving palliative radiotherapy to the T11-L5 vertebrae (metastatic lesions at T12 and L4, and to the left hip (status post hemiarthroplasty).    This patient was seen today for an on treatment visit.  To date, the patient has received 1500 cGy of a palliative 3000 cGy regimen to the lumbar spine, and 1600 cGy of a 2000 cGy regimen to the left hip region.    Radiation Treatments       Active   Plans   T11-L5   Most recent treatment: Dose planned: 300 cGy (fraction 1 on 10/30/2024)   Total: Dose planned: 3,000 cGy (10 fractions)   Elapsed Days: 0      Lt Hip   Most recent treatment: Dose planned: 400 cGy (fraction 4 on 2024)   Total: Dose planned: 2,000 cGy (5 fractions)   Elapsed Days: 6      T11-L5   Most recent treatment: Dose planned: 300 cGy (fraction 5 on 2024)   Total: Dose planned: 3,000 cGy (10 fractions)   Elapsed Days: 6      Reference Points   T11-L5   Most recent treatment: Dose given: 300 cGy (on 10/30/2024)   Total: Dose given: 300 cGy   Elapsed Days: 0      PTV Hip   Most recent treatment: Dose given: 400 cGy (on 2024)   Total: Dose given: 1,600 cGy   Elapsed Days: 6      T11-L5   Most recent treatment: Dose given: 300 cGy (on 2024)   Total: Dose given: 1,500 cGy   Elapsed Days: 6           Historical   No historical radiation treatments to show.              Subjective      Treatment Tolerance:  Mrs. Loomis appears to be tolerating radiotherapy well to both sites.  She does report back transient pain after treatment to the back.  Pain is well-controlled with over-the-counter pain medications.  Patient also reported episodes of nausea after the first treatment to the lumbar spine.  She is taking  Zofran with good effect.  The patient denies diarrhea.    Medications:     Current Outpatient Medications:     albuterol (PROVENTIL) (2.5 MG/3ML) 0.083% nebulizer solution, prn, Disp: , Rfl:     albuterol sulfate  (90 Base) MCG/ACT inhaler, prn, Disp: , Rfl:     Azelastine HCl 137 MCG/SPRAY solution, prn, Disp: , Rfl:     carvedilol (COREG) 25 MG tablet, TAKE ONE TABLET BY MOUTH TWICE A DAY (STOP METOPROLOL), Disp: 180 tablet, Rfl: 3    furosemide (LASIX) 20 MG tablet, Take 1 tablet by mouth Daily As Needed (edema)., Disp: 90 tablet, Rfl: 3    glyburide (DIAbeta) 5 MG tablet, 2 tablets 2 (Two) Times a Day., Disp: , Rfl:     guaiFENesin (MUCINEX) 600 MG 12 hr tablet, Take 1 tablet by mouth At Night As Needed for Cough., Disp: , Rfl:     letrozole (FEMARA) 2.5 MG tablet, Take 1 tablet by mouth., Disp: , Rfl:     metFORMIN (GLUCOPHAGE) 1000 MG tablet, Take 1 tablet by mouth 2 (Two) Times a Day., Disp: , Rfl:     olmesartan (BENICAR) 40 MG tablet, TAKE ONE TABLET BY MOUTH DAILY, Disp: 30 tablet, Rfl: 5    prochlorperazine (COMPAZINE) 10 MG tablet, Take 1 tablet by mouth Every 6 (Six) Hours As Needed for Nausea or Vomiting., Disp: 60 tablet, Rfl: 0    rosuvastatin (CRESTOR) 40 MG tablet, Every Night., Disp: , Rfl:     sertraline (ZOLOFT) 50 MG tablet, Take 1 tablet by mouth Daily., Disp: , Rfl:     Trulicity 1.5 MG/0.5ML solution pen-injector, Inject  under the skin into the appropriate area as directed 1 (One) Time Per Week., Disp: , Rfl:     aspirin (aspirin) 81 MG EC tablet, Take 1 tablet by mouth Daily. (Patient not taking: Reported on 10/15/2024), Disp: 90 tablet, Rfl: 3    eplerenone (INSPRA) 25 MG tablet, TAKE QOD (Patient not taking: Reported on 11/5/2024), Disp: 30 tablet, Rfl: 11    fluticasone (FLONASE) 50 MCG/ACT nasal spray, 1 spray into the nostril(s) as directed by provider Daily As Needed. prn (Patient not taking: Reported on 10/15/2024), Disp: , Rfl:     Allergies:   Allergies   Allergen Reactions  "   Penicillins Anaphylaxis    Propoxyphene Anaphylaxis and Swelling    Sterols (Pine) Delirium and Hallucinations    Aldactone [Spironolactone] Itching    Jardiance [Empagliflozin] Other (See Comments)     Necrotizing skin issues    Meperidine Confusion    Prednisone Other (See Comments)     hallucinate & feel \"outta control\" but can decadron w/no problems    Sulfamethoxazole Unknown (See Comments)     Objective     Physical Exam:  The patient is an elderly white female in no acute distress.  Patient in wheelchair.  She was alerted to her elevated blood pressure.  KPS 80    Vital Signs:   Vitals:    11/05/24 0812   BP: 168/75   Pulse: 76   Resp: 18   Temp: 97.9 °F (36.6 °C)   TempSrc: Temporal   SpO2: 98%   PainSc: 10-Worst pain ever   PainLoc: Back     There is no height or weight on file to calculate BMI.     Neck: Short, supple, no detectable cervical or periclavicular adenopathy  Heart: Regular rate and rhythm  Lungs: Clear to auscultation bilaterally  Breast Distortion of right breast from prior surgery  Extremities: No cyanosis or clubbing.  2+ left arm swelling  Integumentary: No radiation skin changes noted over the lower back  Abdomen/pelvis: Not examined due to patient mobility issues    Current Total XRT Dose (cGY):    Radiation Treatments       Active   Plans   T11-L5   Most recent treatment: Dose planned: 300 cGy (fraction 1 on 10/30/2024)   Total: Dose planned: 3,000 cGy (10 fractions)   Elapsed Days: 0      Lt Hip   Most recent treatment: Dose planned: 400 cGy (fraction 4 on 11/5/2024)   Total: Dose planned: 2,000 cGy (5 fractions)   Elapsed Days: 6      T11-L5   Most recent treatment: Dose planned: 300 cGy (fraction 5 on 11/5/2024)   Total: Dose planned: 3,000 cGy (10 fractions)   Elapsed Days: 6      Reference Points   T11-L5   Most recent treatment: Dose given: 300 cGy (on 10/30/2024)   Total: Dose given: 300 cGy   Elapsed Days: 0      PTV Hip   Most recent treatment: Dose given: 400 cGy (on " 11/5/2024)   Total: Dose given: 1,600 cGy   Elapsed Days: 6      T11-L5   Most recent treatment: Dose given: 300 cGy (on 11/5/2024)   Total: Dose given: 1,500 cGy   Elapsed Days: 6           Historical   No historical radiation treatments to show.              Plan      Plan:   Patient was seen today for an on treatment visit. Patient is receiving radiation therapy to the lumbar spine and left hip/upper femur. Patient is stable and tolerating radiation therapy well with minimal side effects. Continue with radiation therapy.     I have reviewed treatment setup notes, checked and approved the daily guidance images. I reviewed dose delivery, treatment parameters and deemed them appropriate. We plan to continue radiation therapy as prescribed.     Digital speech recognition software was used to dictate parts of this note, some dictation errors may occur.    Randolph Gómez MD   11/05/24 08:39 EST

## 2024-11-06 ENCOUNTER — HOSPITAL ENCOUNTER (OUTPATIENT)
Dept: RADIATION ONCOLOGY | Facility: HOSPITAL | Age: 79
Setting detail: RADIATION/ONCOLOGY SERIES
Discharge: HOME | End: 2024-11-06
Payer: MEDICARE

## 2024-11-06 LAB
RAD ONC ARIA COURSE ID: NORMAL
RAD ONC ARIA COURSE LAST TREATMENT DATE: NORMAL
RAD ONC ARIA COURSE START DATE: NORMAL
RAD ONC ARIA COURSE TREATMENT ELAPSED DAYS: 7
RAD ONC ARIA FIRST TREATMENT DATE: NORMAL
RAD ONC ARIA PLAN FRACTIONS TREATED TO DATE: 5
RAD ONC ARIA PLAN FRACTIONS TREATED TO DATE: 6
RAD ONC ARIA PLAN ID: NORMAL
RAD ONC ARIA PLAN ID: NORMAL
RAD ONC ARIA PLAN PRESCRIBED DOSE PER FRACTION: 3 GY
RAD ONC ARIA PLAN PRESCRIBED DOSE PER FRACTION: 4 GY
RAD ONC ARIA PLAN PRIMARY REFERENCE POINT: NORMAL
RAD ONC ARIA PLAN PRIMARY REFERENCE POINT: NORMAL
RAD ONC ARIA PLAN TOTAL FRACTIONS PRESCRIBED: 10
RAD ONC ARIA PLAN TOTAL FRACTIONS PRESCRIBED: 5
RAD ONC ARIA PLAN TOTAL PRESCRIBED DOSE: 2000 CGY
RAD ONC ARIA PLAN TOTAL PRESCRIBED DOSE: 3000 CGY
RAD ONC ARIA REFERENCE POINT DOSAGE GIVEN TO DATE: 18 GY
RAD ONC ARIA REFERENCE POINT DOSAGE GIVEN TO DATE: 20 GY
RAD ONC ARIA REFERENCE POINT ID: NORMAL
RAD ONC ARIA REFERENCE POINT ID: NORMAL
RAD ONC ARIA REFERENCE POINT SESSION DOSAGE GIVEN: 3 GY
RAD ONC ARIA REFERENCE POINT SESSION DOSAGE GIVEN: 4 GY

## 2024-11-06 PROCEDURE — 77412 RADIATION TX DELIVERY LVL 3: CPT | Performed by: RADIOLOGY

## 2024-11-06 PROCEDURE — 77427 RADIATION TX MANAGEMENT X5: CPT | Performed by: RADIOLOGY

## 2024-11-07 ENCOUNTER — HOSPITAL ENCOUNTER (OUTPATIENT)
Dept: RADIATION ONCOLOGY | Facility: HOSPITAL | Age: 79
Setting detail: RADIATION/ONCOLOGY SERIES
Discharge: HOME | End: 2024-11-07
Payer: MEDICARE

## 2024-11-07 LAB
RAD ONC ARIA COURSE ID: NORMAL
RAD ONC ARIA COURSE LAST TREATMENT DATE: NORMAL
RAD ONC ARIA COURSE START DATE: NORMAL
RAD ONC ARIA COURSE TREATMENT ELAPSED DAYS: 8
RAD ONC ARIA FIRST TREATMENT DATE: NORMAL
RAD ONC ARIA PLAN FRACTIONS TREATED TO DATE: 7
RAD ONC ARIA PLAN ID: NORMAL
RAD ONC ARIA PLAN PRESCRIBED DOSE PER FRACTION: 3 GY
RAD ONC ARIA PLAN PRIMARY REFERENCE POINT: NORMAL
RAD ONC ARIA PLAN TOTAL FRACTIONS PRESCRIBED: 10
RAD ONC ARIA PLAN TOTAL PRESCRIBED DOSE: 3000 CGY
RAD ONC ARIA REFERENCE POINT DOSAGE GIVEN TO DATE: 21 GY
RAD ONC ARIA REFERENCE POINT ID: NORMAL
RAD ONC ARIA REFERENCE POINT SESSION DOSAGE GIVEN: 3 GY

## 2024-11-07 PROCEDURE — 77336 RADIATION PHYSICS CONSULT: CPT | Performed by: RADIOLOGY

## 2024-11-07 PROCEDURE — 77412 RADIATION TX DELIVERY LVL 3: CPT | Performed by: RADIOLOGY

## 2024-11-08 ENCOUNTER — HOSPITAL ENCOUNTER (OUTPATIENT)
Dept: RADIATION ONCOLOGY | Facility: HOSPITAL | Age: 79
Setting detail: RADIATION/ONCOLOGY SERIES
Discharge: HOME | End: 2024-11-08
Payer: MEDICARE

## 2024-11-08 LAB
RAD ONC ARIA COURSE ID: NORMAL
RAD ONC ARIA COURSE LAST TREATMENT DATE: NORMAL
RAD ONC ARIA COURSE START DATE: NORMAL
RAD ONC ARIA COURSE TREATMENT ELAPSED DAYS: 9
RAD ONC ARIA FIRST TREATMENT DATE: NORMAL
RAD ONC ARIA PLAN FRACTIONS TREATED TO DATE: 8
RAD ONC ARIA PLAN ID: NORMAL
RAD ONC ARIA PLAN PRESCRIBED DOSE PER FRACTION: 3 GY
RAD ONC ARIA PLAN PRIMARY REFERENCE POINT: NORMAL
RAD ONC ARIA PLAN TOTAL FRACTIONS PRESCRIBED: 10
RAD ONC ARIA PLAN TOTAL PRESCRIBED DOSE: 3000 CGY
RAD ONC ARIA REFERENCE POINT DOSAGE GIVEN TO DATE: 24 GY
RAD ONC ARIA REFERENCE POINT ID: NORMAL
RAD ONC ARIA REFERENCE POINT SESSION DOSAGE GIVEN: 3 GY

## 2024-11-11 ENCOUNTER — HOSPITAL ENCOUNTER (OUTPATIENT)
Dept: RADIATION ONCOLOGY | Facility: HOSPITAL | Age: 79
Setting detail: RADIATION/ONCOLOGY SERIES
Discharge: HOME | End: 2024-11-11
Payer: MEDICARE

## 2024-11-11 LAB
RAD ONC ARIA COURSE ID: NORMAL
RAD ONC ARIA COURSE LAST TREATMENT DATE: NORMAL
RAD ONC ARIA COURSE START DATE: NORMAL
RAD ONC ARIA COURSE TREATMENT ELAPSED DAYS: 12
RAD ONC ARIA FIRST TREATMENT DATE: NORMAL
RAD ONC ARIA PLAN FRACTIONS TREATED TO DATE: 9
RAD ONC ARIA PLAN ID: NORMAL
RAD ONC ARIA PLAN PRESCRIBED DOSE PER FRACTION: 3 GY
RAD ONC ARIA PLAN PRIMARY REFERENCE POINT: NORMAL
RAD ONC ARIA PLAN TOTAL FRACTIONS PRESCRIBED: 10
RAD ONC ARIA PLAN TOTAL PRESCRIBED DOSE: 3000 CGY
RAD ONC ARIA REFERENCE POINT DOSAGE GIVEN TO DATE: 27 GY
RAD ONC ARIA REFERENCE POINT ID: NORMAL
RAD ONC ARIA REFERENCE POINT SESSION DOSAGE GIVEN: 3 GY

## 2024-11-11 PROCEDURE — 77412 RADIATION TX DELIVERY LVL 3: CPT | Performed by: RADIOLOGY

## 2024-11-12 ENCOUNTER — HOSPITAL ENCOUNTER (OUTPATIENT)
Dept: RADIATION ONCOLOGY | Facility: HOSPITAL | Age: 79
Setting detail: RADIATION/ONCOLOGY SERIES
Discharge: HOME | End: 2024-11-12
Payer: MEDICARE

## 2024-11-12 VITALS
HEART RATE: 77 BPM | SYSTOLIC BLOOD PRESSURE: 152 MMHG | TEMPERATURE: 97.1 F | DIASTOLIC BLOOD PRESSURE: 65 MMHG | OXYGEN SATURATION: 100 % | RESPIRATION RATE: 18 BRPM

## 2024-11-12 DIAGNOSIS — Z17.0 MALIGNANT NEOPLASM OF UPPER-OUTER QUADRANT OF LEFT BREAST IN FEMALE, ESTROGEN RECEPTOR POSITIVE: Primary | ICD-10-CM

## 2024-11-12 DIAGNOSIS — C50.412 MALIGNANT NEOPLASM OF UPPER-OUTER QUADRANT OF LEFT BREAST IN FEMALE, ESTROGEN RECEPTOR POSITIVE: Primary | ICD-10-CM

## 2024-11-12 LAB
RAD ONC ARIA COURSE ID: NORMAL
RAD ONC ARIA COURSE LAST TREATMENT DATE: NORMAL
RAD ONC ARIA COURSE START DATE: NORMAL
RAD ONC ARIA COURSE TREATMENT ELAPSED DAYS: 13
RAD ONC ARIA FIRST TREATMENT DATE: NORMAL
RAD ONC ARIA PLAN FRACTIONS TREATED TO DATE: 10
RAD ONC ARIA PLAN ID: NORMAL
RAD ONC ARIA PLAN PRESCRIBED DOSE PER FRACTION: 3 GY
RAD ONC ARIA PLAN PRIMARY REFERENCE POINT: NORMAL
RAD ONC ARIA PLAN TOTAL FRACTIONS PRESCRIBED: 10
RAD ONC ARIA PLAN TOTAL PRESCRIBED DOSE: 3000 CGY
RAD ONC ARIA REFERENCE POINT DOSAGE GIVEN TO DATE: 30 GY
RAD ONC ARIA REFERENCE POINT ID: NORMAL
RAD ONC ARIA REFERENCE POINT SESSION DOSAGE GIVEN: 3 GY

## 2024-11-12 PROCEDURE — 77412 RADIATION TX DELIVERY LVL 3: CPT | Performed by: RADIOLOGY

## 2024-11-12 NOTE — PROGRESS NOTES
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  The patient completes palliative radiotherapy to the T11-L5 vertebrae.  The patient recently completed post hemiarthroplasty radiotherapy to the left hip and upper femur.    This patient was seen today for an on treatment visit.  Patient has received 3000 cGy to the lower thoracic and lumbar spine, and 2000 cGy in 5 treatment fractions to the left hip region.    Radiation Treatments       Active   Plans   T11-L5   Most recent treatment: Dose planned: 300 cGy (fraction 1 on 10/30/2024)   Total: Dose planned: 3,000 cGy (10 fractions)   Elapsed Days: 0      Reference Points   T11-L5   Most recent treatment: Dose given: 300 cGy (on 10/30/2024)   Total: Dose given: 300 cGy   Elapsed Days: 0      PTV Hip   Most recent treatment: Dose given: 400 cGy (on 2024)   Total: Dose given: 2,000 cGy   Elapsed Days: 7      T11-L5   Most recent treatment: Dose given: 300 cGy (on 2024)   Total: Dose given: 3,000 cGy   Elapsed Days: 13           Historical   Plans   Lt Hip   Most recent treatment: Dose planned: 400 cGy (fraction 5 on 2024)   Total: Dose planned: 2,000 cGy (5 fractions)   Elapsed Days: 7      T11-L5   Most recent treatment: Dose planned: 300 cGy (fraction 10 on 2024)   Total: Dose planned: 3,000 cGy (10 fractions)   Elapsed Days: 13                    Subjective      Treatment Tolerance:  Palliative radiotherapy to both sites has been well-tolerated.  The patient reports occasional nausea which responds well to Zofran.  She also reports some temporary lower back discomfort after treatment due to the hard treatment table.  The patient denies GI issues.    Medications:     Current Outpatient Medications:     albuterol (PROVENTIL) (2.5  MG/3ML) 0.083% nebulizer solution, prn, Disp: , Rfl:     albuterol sulfate  (90 Base) MCG/ACT inhaler, prn, Disp: , Rfl:     Azelastine HCl 137 MCG/SPRAY solution, prn, Disp: , Rfl:     carvedilol (COREG) 25 MG tablet, TAKE ONE TABLET BY MOUTH TWICE A DAY (STOP METOPROLOL), Disp: 180 tablet, Rfl: 3    furosemide (LASIX) 20 MG tablet, Take 1 tablet by mouth Daily As Needed (edema)., Disp: 90 tablet, Rfl: 3    glyburide (DIAbeta) 5 MG tablet, 2 tablets 2 (Two) Times a Day., Disp: , Rfl:     guaiFENesin (MUCINEX) 600 MG 12 hr tablet, Take 1 tablet by mouth At Night As Needed for Cough., Disp: , Rfl:     letrozole (FEMARA) 2.5 MG tablet, Take 1 tablet by mouth., Disp: , Rfl:     metFORMIN (GLUCOPHAGE) 1000 MG tablet, Take 1 tablet by mouth 2 (Two) Times a Day., Disp: , Rfl:     olmesartan (BENICAR) 40 MG tablet, TAKE ONE TABLET BY MOUTH DAILY, Disp: 30 tablet, Rfl: 5    prochlorperazine (COMPAZINE) 10 MG tablet, Take 1 tablet by mouth Every 6 (Six) Hours As Needed for Nausea or Vomiting., Disp: 60 tablet, Rfl: 0    rosuvastatin (CRESTOR) 40 MG tablet, Every Night., Disp: , Rfl:     sertraline (ZOLOFT) 50 MG tablet, Take 1 tablet by mouth Daily., Disp: , Rfl:     Trulicity 1.5 MG/0.5ML solution pen-injector, Inject  under the skin into the appropriate area as directed 1 (One) Time Per Week., Disp: , Rfl:     aspirin (aspirin) 81 MG EC tablet, Take 1 tablet by mouth Daily. (Patient not taking: Reported on 11/12/2024), Disp: 90 tablet, Rfl: 3    eplerenone (INSPRA) 25 MG tablet, TAKE QOD (Patient not taking: Reported on 10/30/2024), Disp: 30 tablet, Rfl: 11    fluticasone (FLONASE) 50 MCG/ACT nasal spray, 1 spray into the nostril(s) as directed by provider Daily As Needed. prn (Patient not taking: Reported on 11/12/2024), Disp: , Rfl:     Allergies:   Allergies   Allergen Reactions    Penicillins Anaphylaxis    Propoxyphene Anaphylaxis and Swelling    Sterols (Pine) Delirium and Hallucinations    Aldactone  "[Spironolactone] Itching    Jardiance [Empagliflozin] Other (See Comments)     Necrotizing skin issues    Meperidine Confusion    Prednisone Other (See Comments)     hallucinate & feel \"outta control\" but can decadron w/no problems    Sulfamethoxazole Unknown (See Comments)     Objective     Physical Exam:  The patient is an elderly white female in no acute distress.  Patient is in a wheelchair.  She was alerted to her elevated blood pressure.  KPS 80.    Vital Signs:   Vitals:    11/12/24 0824   BP: 152/65   Pulse: 77   Resp: 18   Temp: 97.1 °F (36.2 °C)   TempSrc: Temporal   SpO2: 100%   PainSc: 10-Worst pain ever   PainLoc: Back     There is no height or weight on file to calculate BMI.     Neck: Short, supple, no cervical or periclavicular adenopathy  Heart: Regular rate and rhythm  Lungs: Clear bilaterally  Breasts: Distortion of right breast from prior surgery  Extremities: 2+ left arm swelling  Integumentary: No radiation changes noted over lower back or left hip/upper thigh region,  No radiation changes noted over low back    Current Total XRT Dose (cGY):    Radiation Treatments       Active   Plans   T11-L5   Most recent treatment: Dose planned: 300 cGy (fraction 1 on 10/30/2024)   Total: Dose planned: 3,000 cGy (10 fractions)   Elapsed Days: 0      Reference Points   T11-L5   Most recent treatment: Dose given: 300 cGy (on 10/30/2024)   Total: Dose given: 300 cGy   Elapsed Days: 0      PTV Hip   Most recent treatment: Dose given: 400 cGy (on 11/6/2024)   Total: Dose given: 2,000 cGy   Elapsed Days: 7      T11-L5   Most recent treatment: Dose given: 300 cGy (on 11/12/2024)   Total: Dose given: 3,000 cGy   Elapsed Days: 13           Historical   Plans   Lt Hip   Most recent treatment: Dose planned: 400 cGy (fraction 5 on 11/6/2024)   Total: Dose planned: 2,000 cGy (5 fractions)   Elapsed Days: 7      T11-L5   Most recent treatment: Dose planned: 300 cGy (fraction 10 on 11/12/2024)   Total: Dose planned: 3,000 " cGy (10 fractions)   Elapsed Days: 13                    Plan      Plan:   Patient was seen today for an on treatment visit. Patient pleats radiation therapy to the forementioned sites.  Radiotherapy was well-tolerated with minimal if any side effects.    The patient will return for a recheck by our service in 3 months.  She was instructed to contact me before that date should she have any radiotherapy related problems, questions, or special concerns.  The patient will see Dr. Hamilton at [facility] in mid December 2024.  The patient will have Zofran and see Dr. Gallo, her medical oncologist, at [facility] on 11/13/2024.    I have reviewed treatment setup notes, checked and approved the daily guidance images. I reviewed dose delivery, treatment parameters and deemed them appropriate. We plan to continue radiation therapy as prescribed.     Digital speech recognition software was used to dictate parts of this note, some dictation errors may occur.    Randolph Gómez MD   11/12/24 08:29 EST

## 2024-11-13 LAB
RAD ONC ARIA COURSE END DATE: NORMAL
RAD ONC ARIA COURSE ID: NORMAL
RAD ONC ARIA COURSE LAST TREATMENT DATE: NORMAL
RAD ONC ARIA COURSE START DATE: NORMAL
RAD ONC ARIA COURSE TREATMENT ELAPSED DAYS: 13
RAD ONC ARIA FIRST TREATMENT DATE: NORMAL
RAD ONC ARIA PLAN FRACTIONS TREATED TO DATE: 10
RAD ONC ARIA PLAN FRACTIONS TREATED TO DATE: 5
RAD ONC ARIA PLAN ID: NORMAL
RAD ONC ARIA PLAN ID: NORMAL
RAD ONC ARIA PLAN NAME: NORMAL
RAD ONC ARIA PLAN NAME: NORMAL
RAD ONC ARIA PLAN PRESCRIBED DOSE PER FRACTION: 3 GY
RAD ONC ARIA PLAN PRESCRIBED DOSE PER FRACTION: 4 GY
RAD ONC ARIA PLAN PRIMARY REFERENCE POINT: NORMAL
RAD ONC ARIA PLAN PRIMARY REFERENCE POINT: NORMAL
RAD ONC ARIA PLAN TOTAL FRACTIONS PRESCRIBED: 10
RAD ONC ARIA PLAN TOTAL FRACTIONS PRESCRIBED: 5
RAD ONC ARIA PLAN TOTAL PRESCRIBED DOSE: 2000 CGY
RAD ONC ARIA PLAN TOTAL PRESCRIBED DOSE: 3000 CGY
RAD ONC ARIA REFERENCE POINT DOSAGE GIVEN TO DATE: 20 GY
RAD ONC ARIA REFERENCE POINT DOSAGE GIVEN TO DATE: 30 GY
RAD ONC ARIA REFERENCE POINT ID: NORMAL
RAD ONC ARIA REFERENCE POINT ID: NORMAL

## 2024-11-21 ENCOUNTER — TELEPHONE (OUTPATIENT)
Dept: CARDIOLOGY | Facility: CLINIC | Age: 79
End: 2024-11-21
Payer: MEDICARE

## 2024-11-21 NOTE — TELEPHONE ENCOUNTER
Called and spoke with Anastacia with an Appointment time and date. Patient could not do that date will continue to find her an appointment.

## 2024-11-29 DIAGNOSIS — I10 ESSENTIAL HYPERTENSION: ICD-10-CM

## 2024-12-02 RX ORDER — CARVEDILOL 25 MG/1
TABLET ORAL
Qty: 180 TABLET | Refills: 3 | Status: SHIPPED | OUTPATIENT
Start: 2024-12-02

## 2024-12-22 DIAGNOSIS — I10 ESSENTIAL HYPERTENSION: ICD-10-CM

## 2025-01-03 RX ORDER — OLMESARTAN MEDOXOMIL 40 MG/1
40 TABLET ORAL DAILY
Qty: 30 TABLET | Refills: 5 | Status: SHIPPED | OUTPATIENT
Start: 2025-01-03

## 2025-05-15 ENCOUNTER — OFFICE VISIT (OUTPATIENT)
Dept: CARDIOLOGY | Facility: CLINIC | Age: 80
End: 2025-05-15
Payer: MEDICARE

## 2025-05-15 VITALS
WEIGHT: 174 LBS | HEART RATE: 80 BPM | DIASTOLIC BLOOD PRESSURE: 59 MMHG | HEIGHT: 64 IN | BODY MASS INDEX: 29.71 KG/M2 | OXYGEN SATURATION: 94 % | SYSTOLIC BLOOD PRESSURE: 105 MMHG

## 2025-05-15 DIAGNOSIS — F17.200 SMOKER: ICD-10-CM

## 2025-05-15 DIAGNOSIS — R06.02 SHORTNESS OF BREATH: ICD-10-CM

## 2025-05-15 DIAGNOSIS — E78.2 MIXED HYPERLIPIDEMIA: ICD-10-CM

## 2025-05-15 DIAGNOSIS — R00.2 PALPITATIONS: ICD-10-CM

## 2025-05-15 DIAGNOSIS — I10 ESSENTIAL HYPERTENSION: ICD-10-CM

## 2025-05-15 DIAGNOSIS — R07.2 PRECORDIAL PAIN: Primary | ICD-10-CM

## 2025-05-15 PROCEDURE — 93000 ELECTROCARDIOGRAM COMPLETE: CPT | Performed by: INTERNAL MEDICINE

## 2025-05-15 PROCEDURE — 3078F DIAST BP <80 MM HG: CPT | Performed by: INTERNAL MEDICINE

## 2025-05-15 PROCEDURE — 99214 OFFICE O/P EST MOD 30 MIN: CPT | Performed by: INTERNAL MEDICINE

## 2025-05-15 PROCEDURE — 3074F SYST BP LT 130 MM HG: CPT | Performed by: INTERNAL MEDICINE

## 2025-05-15 RX ORDER — LOSARTAN POTASSIUM 100 MG/1
50 TABLET ORAL DAILY
COMMUNITY

## 2025-05-15 RX ORDER — LEVOCETIRIZINE DIHYDROCHLORIDE 5 MG/1
5 TABLET, FILM COATED ORAL DAILY
COMMUNITY
Start: 2025-04-24

## 2025-05-15 RX ORDER — ONDANSETRON 4 MG/1
4 TABLET, ORALLY DISINTEGRATING ORAL EVERY 8 HOURS PRN
COMMUNITY
Start: 2025-03-26

## 2025-05-15 RX ORDER — CARVEDILOL 12.5 MG/1
12.5 TABLET ORAL 2 TIMES DAILY WITH MEALS
Qty: 60 TABLET | Refills: 11 | Status: SHIPPED | OUTPATIENT
Start: 2025-05-15

## 2025-05-15 NOTE — PROGRESS NOTES
Subjective   Anastacia Loomis is a 80 y.o. female     Chief Complaint   Patient presents with    Follow-up     Here for yearly f/u    Hyperlipidemia    Hypertension    Palpitations       PROBLEM LIST:     0. CAD, severe 3 vessel per CT chest at  12-3-2020  0.1 Dobutamine stress echo, 4-8-2022, no ischemia,   There is very mild inferobasilar and diaphragmatic hypokinesis at baseline with normal augmentation of wall motion and wall thickening in all segments.  There was an increase in global left ventricular ejection fraction and decrease in left ventricular end-systolic dimensions.  1. HTN  2. Hyperlipidemia  3. DM, type 2  4. Hx Breast CA, left s/p chemo./rad. partial mastect. Followed by [facility]  5. Asthma  6. Palpitations  7. Anemia  8. Lymphedema left arm/hand  9. Chronic smoker  10. Echo, 3-, EF 50+-%, mod. LVH, grade 1 DD, minimal mitral leaflet sclerosis, trivial-mild MR, trivial TR, trivial post and post. Lateral pericardial effusion, pulm. Pressures 30 mmHg    Specialty Problems          Cardiology Problems    Essential hypertension        Mixed hyperlipidemia        Palpitations             HPI:    Ms. Loomis returns for follow-up on the above.    Since last being seen in our office she has been diagnosed with recurrent breast cancer metastatic to the spine and the hip.  She has had hip surgery for a pathologic fracture of the femoral neck.  She has had radiation to spine mets.    Ms. Loomis describes good and bad days.  When she feels poorly she feels she has a sloshing in her abdomen, and that her abdomen is distended.  She has had nausea for 2 weeks and chronic anorexia.  She describes orthostatic dizziness.  She also describes generalized anergy and easy fatigability.                      PRIOR MEDICATIONS    Current Outpatient Medications on File Prior to Visit   Medication Sig Dispense Refill    albuterol (PROVENTIL) (2.5 MG/3ML) 0.083% nebulizer solution prn      albuterol  sulfate  (90 Base) MCG/ACT inhaler prn      Azelastine HCl 137 MCG/SPRAY solution prn      carvedilol (COREG) 25 MG tablet TAKE 1 TABLET BY MOUTH TWICE A DAY (STOP METOPROLOL) 180 tablet 3    Cholecalciferol 125 MCG (5000 UT) tablet Take 1 tablet by mouth Daily.      fluticasone (FLONASE) 50 MCG/ACT nasal spray Administer 1 spray into the nostril(s) as directed by provider Daily As Needed. prn      furosemide (LASIX) 20 MG tablet Take 1 tablet by mouth Daily As Needed (edema). 90 tablet 3    glyburide (DIAbeta) 5 MG tablet Take 1 tablet by mouth 2 (Two) Times a Day With Meals.      guaiFENesin (MUCINEX) 600 MG 12 hr tablet Take 1 tablet by mouth At Night As Needed for Cough.      letrozole (FEMARA) 2.5 MG tablet Take 1 tablet by mouth Daily.      levocetirizine (XYZAL) 5 MG tablet Take 1 tablet by mouth Daily.      losartan (COZAAR) 100 MG tablet Take 0.5 tablets by mouth Daily.      metFORMIN (GLUCOPHAGE) 1000 MG tablet Take 1 tablet by mouth 2 (Two) Times a Day.      ondansetron ODT (ZOFRAN-ODT) 4 MG disintegrating tablet Place 1 tablet on the tongue Every 8 (Eight) Hours As Needed.      rosuvastatin (CRESTOR) 40 MG tablet Every Night.      sertraline (ZOLOFT) 50 MG tablet Take 1 tablet by mouth Daily.      Trulicity 1.5 MG/0.5ML solution pen-injector Inject  under the skin into the appropriate area as directed 1 (One) Time Per Week.      [DISCONTINUED] aspirin (aspirin) 81 MG EC tablet Take 1 tablet by mouth Daily. (Patient not taking: Reported on 10/15/2024) 90 tablet 3    [DISCONTINUED] eplerenone (INSPRA) 25 MG tablet TAKE QOD (Patient not taking: Reported on 10/30/2024) 30 tablet 11    [DISCONTINUED] olmesartan (BENICAR) 40 MG tablet TAKE 1 TABLET BY MOUTH DAILY 30 tablet 5    [DISCONTINUED] prochlorperazine (COMPAZINE) 10 MG tablet Take 1 tablet by mouth Every 6 (Six) Hours As Needed for Nausea or Vomiting. 60 tablet 0     No current facility-administered medications on file prior to visit.        ALLERGIES:    Penicillins, Propoxyphene, Sterols (pine), Codeine, Hydrocodone-acetaminophen, Aldactone [spironolactone], Jardiance [empagliflozin], Meperidine, Prednisone, and Sulfamethoxazole    PAST MEDICAL HISTORY:    Past Medical History:   Diagnosis Date    Anemia     Arthritis     shoulder on left and right hip    Asthma     Cancer     left breast, chemo. rad. partial mastect., spine with rad.    Diabetes mellitus     History of Holter monitoring     History of ulceration     Hyperlipidemia     Hypertension     Irregular heart rhythm     Lymphedema     Menopause        SURGICAL HISTORY:    Past Surgical History:   Procedure Laterality Date    HYSTERECTOMY      LYMPHADENECTOMY      MASTECTOMY, PARTIAL      left    SINUS SURGERY      TOTAL HIP ARTHROPLASTY Left     TUMOR REMOVAL         SOCIAL HISTORY:    Social History     Socioeconomic History    Marital status:    Tobacco Use    Smoking status: Every Day     Current packs/day: 0.50     Types: Cigarettes    Smokeless tobacco: Never    Tobacco comments:     undecided on quiting   Substance and Sexual Activity    Alcohol use: Never    Drug use: Never    Sexual activity: Defer       FAMILY HISTORY:    Family History   Problem Relation Age of Onset    Heart attack Mother     Rheumatic fever Mother     Stroke Father     Cancer Sister     Cancer Brother     Heart attack Brother        Review of Systems   Constitutional:  Positive for fatigue.   HENT: Negative.     Eyes:  Positive for visual disturbance (glasses).   Respiratory:  Positive for shortness of breath (with exertion).    Cardiovascular:  Positive for palpitations (occas.). Negative for chest pain and leg swelling.   Gastrointestinal:  Positive for nausea.   Endocrine: Negative.    Genitourinary: Negative.    Musculoskeletal:  Positive for arthralgias, gait problem (ambulates with walker) and myalgias.   Skin: Negative.    Allergic/Immunologic: Positive for environmental allergies.  "  Neurological:  Positive for weakness (overall) and light-headedness (positional). Negative for syncope.   Hematological:  Bruises/bleeds easily.   Psychiatric/Behavioral:  Positive for sleep disturbance (off and on).        VISIT VITALS:  Vitals:    05/15/25 1000   BP: 105/59   BP Location: Right arm   Patient Position: Sitting   Pulse: 80   SpO2: 94%   Weight: 78.9 kg (174 lb)   Height: 162.6 cm (64\")      /59 (BP Location: Right arm, Patient Position: Sitting)   Pulse 80   Ht 162.6 cm (64\")   Wt 78.9 kg (174 lb)   SpO2 94%   BMI 29.87 kg/m²     RECENT LABS:    Objective       Physical Exam  Vitals and nursing note reviewed.   Constitutional:       General: She is not in acute distress.     Appearance: She is well-developed.   HENT:      Head: Normocephalic and atraumatic.   Eyes:      Conjunctiva/sclera: Conjunctivae normal.      Pupils: Pupils are equal, round, and reactive to light.   Neck:      Vascular: No carotid bruit, hepatojugular reflux or JVD.      Trachea: No tracheal deviation.      Comments: Nl. Carotid upstrokes  Cardiovascular:      Rate and Rhythm: Normal rate and regular rhythm.      Pulses:           Radial pulses are 2+ on the right side and 2+ on the left side.      Heart sounds: Heart sounds are distant (S1 & S2). No murmur heard.     No friction rub. No S3 or S4 sounds.   Pulmonary:      Effort: Pulmonary effort is normal.      Breath sounds: Normal breath sounds. No wheezing, rhonchi or rales.      Comments: Nl. Expir. Phase  Nl. Breath sound intensity    Abdominal:      General: Bowel sounds are normal. There is no distension or abdominal bruit.      Palpations: Abdomen is soft. There is no mass.      Tenderness: There is no abdominal tenderness. There is no guarding or rebound.      Comments: No organomegaly   Musculoskeletal:         General: No tenderness or deformity. Normal range of motion.      Cervical back: Normal range of motion and neck supple.      Right lower leg: " Edema present.      Left lower leg: Edema present.      Comments: BLE, trace edema, palpable pedal pulses  LUE / hand lymphedema     Skin:     General: Skin is warm and dry.      Coloration: Skin is not pale.      Findings: No erythema or rash.   Neurological:      Mental Status: She is alert and oriented to person, place, and time.   Psychiatric:         Behavior: Behavior normal.         Thought Content: Thought content normal.         Judgment: Judgment normal.         ECG 12 Lead    Date/Time: 5/15/2025 10:11 AM  Performed by: Carmelo Block MD    Authorized by: Carmelo Block MD  Comparison: compared with previous ECG from 11/14/2023          Assessment & Plan   #1.  Symptoms suggestive of ascites.  Physical exam findings are equivocal.  I would like to perform echocardiography to assess for systolic or diastolic dysfunction or pericardial effusion which might be contributing factors.  Effusion is of particular concern given the distant heart sounds.    2.  We will decrease carvedilol to 12.5 mg twice daily.  Hopefully this will allow an increase in chronotropic response to exercise and may help reduce energy and fatigue.    3.  Ms. Loomis will follow with [facility] Candelaria as instructed we will plan on seeing her in follow-up on a as needed basis for echo results or symptoms as discussed at today's visit.   Diagnosis Plan   1. Precordial pain        2. Palpitations        3. Mixed hyperlipidemia        4. Essential hypertension        5. Shortness of breath        6. Smoker            No follow-ups on file.         Anastacia Loomis  reports that she has been smoking cigarettes. She has never used smokeless tobacco. I have educated her on the risk of diseases from using tobacco products such as cancer, COPD, and heart disease.     Patient is in process of stopping smoking.         Advance Care Planning   ACP discussion was held with the patient during this visit. Patient does not have an advance directive,  declines further assistance.              DO YOU VAPE? NO    BMI is >= 25 and <30. (Overweight) The following options were offered after discussion;: pcp addressing               Electronically signed by:    Scribed for Carmelo Block MD by Lorena Garay LPN on May 15, 2025  at 10:09 EDT    I, Carmelo Block MD personally performed the services described in this documentation as scribed by the above named individual in my presence, and it is both accurate and complete. May 15, 2025 10:09 EDT      Dictated Utilizing Dragon Dictation: Part of this note may be an electronic transcription/translation of spoken language to printed text using the Dragon Dictation System.

## 2025-06-02 ENCOUNTER — HOSPITAL ENCOUNTER (OUTPATIENT)
Dept: CARDIOLOGY | Facility: HOSPITAL | Age: 80
Discharge: HOME | End: 2025-06-02
Admitting: INTERNAL MEDICINE
Payer: MEDICARE

## 2025-06-02 VITALS — BODY MASS INDEX: 29.7 KG/M2 | WEIGHT: 173.94 LBS | HEIGHT: 64 IN

## 2025-06-02 DIAGNOSIS — E78.2 MIXED HYPERLIPIDEMIA: ICD-10-CM

## 2025-06-02 DIAGNOSIS — F17.200 SMOKER: ICD-10-CM

## 2025-06-02 DIAGNOSIS — I10 ESSENTIAL HYPERTENSION: ICD-10-CM

## 2025-06-02 DIAGNOSIS — R00.2 PALPITATIONS: ICD-10-CM

## 2025-06-02 DIAGNOSIS — R06.02 SHORTNESS OF BREATH: ICD-10-CM

## 2025-06-02 LAB
AORTIC DIMENSIONLESS INDEX: 0.63 (DI)
AV MEAN PRESS GRAD SYS DOP V1V2: 2.9 MMHG
AV VMAX SYS DOP: 109.2 CM/SEC
BH CV ECHO MEAS - ACS: 1.93 CM
BH CV ECHO MEAS - AO MAX PG: 4.8 MMHG
BH CV ECHO MEAS - AO ROOT DIAM: 3.3 CM
BH CV ECHO MEAS - AO V2 VTI: 23.9 CM
BH CV ECHO MEAS - EDV(CUBED): 159.6 ML
BH CV ECHO MEAS - ESV(CUBED): 62.6 ML
BH CV ECHO MEAS - FS: 26.8 %
BH CV ECHO MEAS - IVS/LVPW: 0.93 CM
BH CV ECHO MEAS - IVSD: 1.01 CM
BH CV ECHO MEAS - LA DIMENSION: 4.3 CM
BH CV ECHO MEAS - LAT PEAK E' VEL: 4.7 CM/SEC
BH CV ECHO MEAS - LV MASS(C)D: 222.9 GRAMS
BH CV ECHO MEAS - LV MAX PG: 2.23 MMHG
BH CV ECHO MEAS - LV MEAN PG: 1.01 MMHG
BH CV ECHO MEAS - LV V1 MAX: 74.7 CM/SEC
BH CV ECHO MEAS - LV V1 VTI: 15.1 CM
BH CV ECHO MEAS - LVIDD: 5.4 CM
BH CV ECHO MEAS - LVIDS: 4 CM
BH CV ECHO MEAS - LVPWD: 1.09 CM
BH CV ECHO MEAS - MED PEAK E' VEL: 4.1 CM/SEC
BH CV ECHO MEAS - MV A MAX VEL: 82.9 CM/SEC
BH CV ECHO MEAS - MV DEC SLOPE: 311 CM/SEC2
BH CV ECHO MEAS - MV DEC TIME: 0.31 SEC
BH CV ECHO MEAS - MV E MAX VEL: 44.8 CM/SEC
BH CV ECHO MEAS - MV E/A: 0.54
BH CV ECHO MEAS - MV MAX PG: 3 MMHG
BH CV ECHO MEAS - MV MEAN PG: 1.15 MMHG
BH CV ECHO MEAS - MV P1/2T: 61.9 MSEC
BH CV ECHO MEAS - MV V2 VTI: 23 CM
BH CV ECHO MEAS - MVA(P1/2T): 3.6 CM2
BH CV ECHO MEAS - PA V2 MAX: 68.6 CM/SEC
BH CV ECHO MEAS - RAP SYSTOLE: 8 MMHG
BH CV ECHO MEAS - RV MAX PG: 1.02 MMHG
BH CV ECHO MEAS - RV V1 MAX: 50.6 CM/SEC
BH CV ECHO MEAS - RV V1 VTI: 11.1 CM
BH CV ECHO MEAS - RVDD: 3 CM
BH CV ECHO MEAS - RVSP: 11 MMHG
BH CV ECHO MEAS - TAPSE (>1.6): 1.79 CM
BH CV ECHO MEAS - TR MAX PG: 2.21 MMHG
BH CV ECHO MEAS - TR MAX VEL: 74.4 CM/SEC
BH CV ECHO MEASUREMENTS AVERAGE E/E' RATIO: 10.18
BH CV XLRA - TDI S': 9.4 CM/SEC
LV EF 3D SEGMENTATION: 56 %
SINUS: 3.2 CM

## 2025-06-02 PROCEDURE — 93306 TTE W/DOPPLER COMPLETE: CPT | Performed by: INTERNAL MEDICINE

## 2025-06-02 PROCEDURE — 93306 TTE W/DOPPLER COMPLETE: CPT

## 2025-08-05 ENCOUNTER — TELEPHONE (OUTPATIENT)
Dept: CARDIOLOGY | Facility: CLINIC | Age: 80
End: 2025-08-05
Payer: MEDICARE

## 2025-08-05 DIAGNOSIS — I10 ESSENTIAL HYPERTENSION: Primary | ICD-10-CM

## 2025-08-05 RX ORDER — OLMESARTAN MEDOXOMIL 40 MG/1
40 TABLET ORAL DAILY
Qty: 90 TABLET | Refills: 3 | Status: SHIPPED | OUTPATIENT
Start: 2025-08-05